# Patient Record
Sex: FEMALE | Race: WHITE | NOT HISPANIC OR LATINO | Employment: OTHER | URBAN - METROPOLITAN AREA
[De-identification: names, ages, dates, MRNs, and addresses within clinical notes are randomized per-mention and may not be internally consistent; named-entity substitution may affect disease eponyms.]

---

## 2017-07-05 ENCOUNTER — HOSPITAL ENCOUNTER (OUTPATIENT)
Dept: GENERAL RADIOLOGY | Facility: HOSPITAL | Age: 72
Discharge: HOME | End: 2017-07-05
Admitting: ORTHOPAEDIC SURGERY

## 2017-07-05 ENCOUNTER — APPOINTMENT (OUTPATIENT)
Dept: PREADMISSION TESTING | Facility: HOSPITAL | Age: 72
End: 2017-07-05

## 2017-07-05 VITALS — HEIGHT: 66 IN | WEIGHT: 201.94 LBS | BODY MASS INDEX: 32.45 KG/M2

## 2017-07-05 DIAGNOSIS — Z01.89 LABORATORY TEST: Primary | ICD-10-CM

## 2017-07-05 LAB
ABO GROUP BLD: NORMAL
ALBUMIN SERPL-MCNC: 4 G/DL (ref 3.2–4.8)
ALBUMIN/GLOB SERPL: 1.5 G/DL (ref 1.5–2.5)
ALP SERPL-CCNC: 161 U/L (ref 25–100)
ALT SERPL W P-5'-P-CCNC: 11 U/L (ref 7–40)
ANION GAP SERPL CALCULATED.3IONS-SCNC: 16 MMOL/L (ref 3–11)
AST SERPL-CCNC: 19 U/L (ref 0–33)
BILIRUB SERPL-MCNC: 0.4 MG/DL (ref 0.3–1.2)
BUN BLD-MCNC: 18 MG/DL (ref 9–23)
BUN/CREAT SERPL: 22.5 (ref 7–25)
CALCIUM SPEC-SCNC: 9.4 MG/DL (ref 8.7–10.4)
CHLORIDE SERPL-SCNC: 97 MMOL/L (ref 99–109)
CO2 SERPL-SCNC: 23 MMOL/L (ref 20–31)
CREAT BLD-MCNC: 0.8 MG/DL (ref 0.6–1.3)
DEPRECATED RDW RBC AUTO: 43.3 FL (ref 37–54)
ERYTHROCYTE [DISTWIDTH] IN BLOOD BY AUTOMATED COUNT: 13.7 % (ref 11.3–14.5)
GFR SERPL CREATININE-BSD FRML MDRD: 70 ML/MIN/1.73
GLOBULIN UR ELPH-MCNC: 2.7 GM/DL
GLUCOSE BLD-MCNC: 100 MG/DL (ref 70–100)
HBA1C MFR BLD: 5.3 % (ref 4.8–5.6)
HCT VFR BLD AUTO: 35.9 % (ref 34.5–44)
HGB BLD-MCNC: 11.5 G/DL (ref 11.5–15.5)
MCH RBC QN AUTO: 27.7 PG (ref 27–31)
MCHC RBC AUTO-ENTMCNC: 32 G/DL (ref 32–36)
MCV RBC AUTO: 86.5 FL (ref 80–99)
PLATELET # BLD AUTO: 257 10*3/MM3 (ref 150–450)
PMV BLD AUTO: 9.7 FL (ref 6–12)
POTASSIUM BLD-SCNC: 4.4 MMOL/L (ref 3.5–5.5)
PROT SERPL-MCNC: 6.7 G/DL (ref 5.7–8.2)
RBC # BLD AUTO: 4.15 10*6/MM3 (ref 3.89–5.14)
RH BLD: POSITIVE
SODIUM BLD-SCNC: 136 MMOL/L (ref 132–146)
WBC NRBC COR # BLD: 4.5 10*3/MM3 (ref 3.5–10.8)

## 2017-07-05 PROCEDURE — 36415 COLL VENOUS BLD VENIPUNCTURE: CPT

## 2017-07-05 PROCEDURE — 80053 COMPREHEN METABOLIC PANEL: CPT | Performed by: ORTHOPAEDIC SURGERY

## 2017-07-05 PROCEDURE — 71020 HC CHEST PA AND LATERAL: CPT

## 2017-07-05 PROCEDURE — 86901 BLOOD TYPING SEROLOGIC RH(D): CPT

## 2017-07-05 PROCEDURE — 85027 COMPLETE CBC AUTOMATED: CPT | Performed by: ORTHOPAEDIC SURGERY

## 2017-07-05 PROCEDURE — 93005 ELECTROCARDIOGRAM TRACING: CPT

## 2017-07-05 PROCEDURE — 83036 HEMOGLOBIN GLYCOSYLATED A1C: CPT | Performed by: ORTHOPAEDIC SURGERY

## 2017-07-05 PROCEDURE — 86900 BLOOD TYPING SEROLOGIC ABO: CPT

## 2017-07-05 RX ORDER — RANITIDINE 150 MG/1
150 TABLET ORAL 2 TIMES DAILY
COMMUNITY

## 2017-07-05 RX ORDER — PRAVASTATIN SODIUM 40 MG
40 TABLET ORAL DAILY
COMMUNITY

## 2017-07-05 RX ORDER — ALBUTEROL SULFATE 90 UG/1
2 AEROSOL, METERED RESPIRATORY (INHALATION) EVERY 4 HOURS PRN
COMMUNITY

## 2017-07-05 RX ORDER — PROPRANOLOL HYDROCHLORIDE 120 MG/1
120 CAPSULE, EXTENDED RELEASE ORAL DAILY
COMMUNITY

## 2017-07-05 RX ORDER — LOSARTAN POTASSIUM 100 MG/1
100 TABLET ORAL DAILY
COMMUNITY

## 2017-07-05 NOTE — DISCHARGE INSTRUCTIONS
The following information and instructions were given:    NPO after MN except sips of water with routine prescribed medication (except blood thinner, diabetes, or weight reducing medication) unless otherwise instructed by your physician.  Do not eat, drink, smoke or chew gum after MN the night before surgery. This also includes no mints.    DO NOT shave, wear makeup or dark nail polish.    Remove all jewelry (advised to go to jeweler if unable to remove).    Leave anything you consider valuable at home.    Leave your suitcase in the car until after your surgery.    Bring the following with you (if applicable)   -picture ID and insurance cards   -Co-pay/deductible required by insurance   -Medications in the original bottles (not a list) including all over-the-counter  medications if not brought to PAT   -Copy of advance directive, living will or power of  documents if not  brought to PAT   -CPAP or BIPAP mask and tubing (do not bring machine)   -Skin prep instructions sheet   -PAT Pass   Education booklet, brochure, handout or binder given to patient.    Pain Control After Surgery handout given to patient.    Respirex use (handout given to patient) and pneumonia prevention.    Signs and Symptoms of infection.    DVT Prevention stressing the importance of ambulation.    Patient to apply Chlorhexadine wipes to surgical area (as instructed) the night before procedure and the AM of procedure.           walking/toileting/standing

## 2017-07-10 ENCOUNTER — HOSPITAL ENCOUNTER (INPATIENT)
Facility: HOSPITAL | Age: 72
LOS: 2 days | Discharge: HOME HEALTH | End: 2017-07-12
Attending: ORTHOPAEDIC SURGERY | Admitting: ORTHOPAEDIC SURGERY

## 2017-07-10 ENCOUNTER — APPOINTMENT (OUTPATIENT)
Dept: GENERAL RADIOLOGY | Facility: HOSPITAL | Age: 72
End: 2017-07-10

## 2017-07-10 ENCOUNTER — ANESTHESIA EVENT (OUTPATIENT)
Dept: PERIOP | Facility: HOSPITAL | Age: 72
End: 2017-07-10

## 2017-07-10 ENCOUNTER — ANESTHESIA (OUTPATIENT)
Dept: PERIOP | Facility: HOSPITAL | Age: 72
End: 2017-07-10

## 2017-07-10 DIAGNOSIS — Z78.9 IMPAIRED MOBILITY AND ADLS: ICD-10-CM

## 2017-07-10 DIAGNOSIS — Z74.09 IMPAIRED MOBILITY AND ADLS: ICD-10-CM

## 2017-07-10 DIAGNOSIS — Z74.09 IMPAIRED FUNCTIONAL MOBILITY, BALANCE, GAIT, AND ENDURANCE: Primary | ICD-10-CM

## 2017-07-10 PROBLEM — M17.11 ARTHRITIS OF KNEE, RIGHT: Status: ACTIVE | Noted: 2017-07-10

## 2017-07-10 PROBLEM — J44.9 COPD (CHRONIC OBSTRUCTIVE PULMONARY DISEASE) (HCC): Status: ACTIVE | Noted: 2017-07-10

## 2017-07-10 PROBLEM — I10 HTN (HYPERTENSION): Status: ACTIVE | Noted: 2017-07-10

## 2017-07-10 PROBLEM — K21.9 GERD (GASTROESOPHAGEAL REFLUX DISEASE): Status: ACTIVE | Noted: 2017-07-10

## 2017-07-10 PROBLEM — Z96.651 STATUS POST TOTAL RIGHT KNEE REPLACEMENT: Status: ACTIVE | Noted: 2017-07-10

## 2017-07-10 PROBLEM — E78.5 HYPERLIPIDEMIA: Status: ACTIVE | Noted: 2017-07-10

## 2017-07-10 LAB
ABO GROUP BLD: NORMAL
BLD GP AB SCN SERPL QL: NEGATIVE
POTASSIUM BLDA-SCNC: 3.98 MMOL/L (ref 3.5–5.3)
RH BLD: POSITIVE

## 2017-07-10 PROCEDURE — 25010000002 PROCHLORPERAZINE EDISYLATE PER 10 MG: Performed by: NURSE PRACTITIONER

## 2017-07-10 PROCEDURE — C1776 JOINT DEVICE (IMPLANTABLE): HCPCS | Performed by: ORTHOPAEDIC SURGERY

## 2017-07-10 PROCEDURE — 25010000002 ONDANSETRON PER 1 MG: Performed by: INTERNAL MEDICINE

## 2017-07-10 PROCEDURE — 25010000002 PROPOFOL 1000 MG/ML EMULSION: Performed by: NURSE ANESTHETIST, CERTIFIED REGISTERED

## 2017-07-10 PROCEDURE — 86900 BLOOD TYPING SEROLOGIC ABO: CPT | Performed by: ORTHOPAEDIC SURGERY

## 2017-07-10 PROCEDURE — 25010000002 ROPIVACAINE PER 1 MG: Performed by: NURSE ANESTHETIST, CERTIFIED REGISTERED

## 2017-07-10 PROCEDURE — 97116 GAIT TRAINING THERAPY: CPT

## 2017-07-10 PROCEDURE — 84132 ASSAY OF SERUM POTASSIUM: CPT | Performed by: ANESTHESIOLOGY

## 2017-07-10 PROCEDURE — C1713 ANCHOR/SCREW BN/BN,TIS/BN: HCPCS | Performed by: ORTHOPAEDIC SURGERY

## 2017-07-10 PROCEDURE — 97110 THERAPEUTIC EXERCISES: CPT

## 2017-07-10 PROCEDURE — 86850 RBC ANTIBODY SCREEN: CPT | Performed by: ORTHOPAEDIC SURGERY

## 2017-07-10 PROCEDURE — 97162 PT EVAL MOD COMPLEX 30 MIN: CPT

## 2017-07-10 PROCEDURE — 25010000003 CEFAZOLIN IN DEXTROSE 2-4 GM/100ML-% SOLUTION: Performed by: ORTHOPAEDIC SURGERY

## 2017-07-10 PROCEDURE — 86901 BLOOD TYPING SEROLOGIC RH(D): CPT | Performed by: ORTHOPAEDIC SURGERY

## 2017-07-10 PROCEDURE — 73560 X-RAY EXAM OF KNEE 1 OR 2: CPT

## 2017-07-10 PROCEDURE — 25010000002 ONDANSETRON PER 1 MG: Performed by: NURSE ANESTHETIST, CERTIFIED REGISTERED

## 2017-07-10 PROCEDURE — 25010000002 HYDROMORPHONE PER 4 MG: Performed by: ORTHOPAEDIC SURGERY

## 2017-07-10 PROCEDURE — C1755 CATHETER, INTRASPINAL: HCPCS | Performed by: ORTHOPAEDIC SURGERY

## 2017-07-10 PROCEDURE — 0SRC0J9 REPLACEMENT OF RIGHT KNEE JOINT WITH SYNTHETIC SUBSTITUTE, CEMENTED, OPEN APPROACH: ICD-10-PCS | Performed by: ORTHOPAEDIC SURGERY

## 2017-07-10 DEVICE — TOTL KN ATTUNE DEPUY 9527038: Type: IMPLANTABLE DEVICE | Site: KNEE | Status: FUNCTIONAL

## 2017-07-10 DEVICE — ATTUNE PATELLA MEDIALIZED ANATOMIC 32MM CEMENTED AOX
Type: IMPLANTABLE DEVICE | Site: KNEE | Status: FUNCTIONAL
Brand: ATTUNE

## 2017-07-10 DEVICE — ATTUNE KNEE SYSTEM TIBIAL INSERT ROTATING PLATFORM CRUCIATE RETAINING SIZE 5 5MM AOX
Type: IMPLANTABLE DEVICE | Site: KNEE | Status: FUNCTIONAL
Brand: ATTUNE

## 2017-07-10 DEVICE — SMARTSET HIGH PERFORMANCE MV MEDIUM VISCOSITY BONE CEMENT 40G
Type: IMPLANTABLE DEVICE | Site: KNEE | Status: FUNCTIONAL
Brand: SMARTSET

## 2017-07-10 DEVICE — ATTUNE KNEE SYSTEM TIBIAL BASE ROTATING PLATFORM SIZE 5 CEMENTED
Type: IMPLANTABLE DEVICE | Site: KNEE | Status: FUNCTIONAL
Brand: ATTUNE

## 2017-07-10 DEVICE — ATTUNE KNEE SYSTEM FEMORAL CRUCIATE RETAINING SIZE 5 RIGHT CEMENTED
Type: IMPLANTABLE DEVICE | Site: KNEE | Status: FUNCTIONAL
Brand: ATTUNE

## 2017-07-10 RX ORDER — ALBUTEROL SULFATE 2.5 MG/3ML
2.5 SOLUTION RESPIRATORY (INHALATION) EVERY 6 HOURS PRN
Status: DISCONTINUED | OUTPATIENT
Start: 2017-07-10 | End: 2017-07-12 | Stop reason: HOSPADM

## 2017-07-10 RX ORDER — CEFAZOLIN SODIUM 2 G/100ML
2 INJECTION, SOLUTION INTRAVENOUS ONCE
Status: DISCONTINUED | OUTPATIENT
Start: 2017-07-10 | End: 2017-07-10 | Stop reason: HOSPADM

## 2017-07-10 RX ORDER — ONDANSETRON 2 MG/ML
4 INJECTION INTRAMUSCULAR; INTRAVENOUS EVERY 6 HOURS PRN
Status: DISCONTINUED | OUTPATIENT
Start: 2017-07-10 | End: 2017-07-12 | Stop reason: HOSPADM

## 2017-07-10 RX ORDER — BISACODYL 5 MG/1
10 TABLET, DELAYED RELEASE ORAL DAILY PRN
Status: DISCONTINUED | OUTPATIENT
Start: 2017-07-10 | End: 2017-07-12 | Stop reason: HOSPADM

## 2017-07-10 RX ORDER — ONDANSETRON 2 MG/ML
4 INJECTION INTRAMUSCULAR; INTRAVENOUS ONCE AS NEEDED
Status: COMPLETED | OUTPATIENT
Start: 2017-07-10 | End: 2017-07-10

## 2017-07-10 RX ORDER — PREGABALIN 75 MG/1
75 CAPSULE ORAL ONCE
Status: COMPLETED | OUTPATIENT
Start: 2017-07-10 | End: 2017-07-10

## 2017-07-10 RX ORDER — FAMOTIDINE 20 MG/1
20 TABLET, FILM COATED ORAL 2 TIMES DAILY
Status: DISCONTINUED | OUTPATIENT
Start: 2017-07-10 | End: 2017-07-12 | Stop reason: HOSPADM

## 2017-07-10 RX ORDER — CELECOXIB 200 MG/1
200 CAPSULE ORAL ONCE
Status: COMPLETED | OUTPATIENT
Start: 2017-07-10 | End: 2017-07-10

## 2017-07-10 RX ORDER — ACETAMINOPHEN 325 MG/1
650 TABLET ORAL ONCE
Status: COMPLETED | OUTPATIENT
Start: 2017-07-10 | End: 2017-07-10

## 2017-07-10 RX ORDER — TRANEXAMIC ACID 100 MG/ML
INJECTION, SOLUTION INTRAVENOUS AS NEEDED
Status: DISCONTINUED | OUTPATIENT
Start: 2017-07-10 | End: 2017-07-10 | Stop reason: SURG

## 2017-07-10 RX ORDER — NICOTINE POLACRILEX 4 MG
15 LOZENGE BUCCAL
Status: DISCONTINUED | OUTPATIENT
Start: 2017-07-10 | End: 2017-07-10 | Stop reason: HOSPADM

## 2017-07-10 RX ORDER — ACETAMINOPHEN 325 MG/1
650 TABLET ORAL EVERY 4 HOURS PRN
Status: DISCONTINUED | OUTPATIENT
Start: 2017-07-10 | End: 2017-07-12 | Stop reason: HOSPADM

## 2017-07-10 RX ORDER — SODIUM CHLORIDE, SODIUM LACTATE, POTASSIUM CHLORIDE, CALCIUM CHLORIDE 600; 310; 30; 20 MG/100ML; MG/100ML; MG/100ML; MG/100ML
100 INJECTION, SOLUTION INTRAVENOUS CONTINUOUS
Status: DISCONTINUED | OUTPATIENT
Start: 2017-07-10 | End: 2017-07-12 | Stop reason: HOSPADM

## 2017-07-10 RX ORDER — PROPRANOLOL HCL 60 MG
120 CAPSULE, EXTENDED RELEASE 24HR ORAL DAILY
Status: DISCONTINUED | OUTPATIENT
Start: 2017-07-10 | End: 2017-07-12 | Stop reason: HOSPADM

## 2017-07-10 RX ORDER — BUPIVACAINE HYDROCHLORIDE 5 MG/ML
INJECTION, SOLUTION EPIDURAL; INTRACAUDAL AS NEEDED
Status: DISCONTINUED | OUTPATIENT
Start: 2017-07-10 | End: 2017-07-10 | Stop reason: SURG

## 2017-07-10 RX ORDER — CEFAZOLIN SODIUM 2 G/100ML
2 INJECTION, SOLUTION INTRAVENOUS EVERY 8 HOURS
Status: COMPLETED | OUTPATIENT
Start: 2017-07-10 | End: 2017-07-11

## 2017-07-10 RX ORDER — FAMOTIDINE 20 MG/1
20 TABLET, FILM COATED ORAL ONCE
Status: COMPLETED | OUTPATIENT
Start: 2017-07-10 | End: 2017-07-10

## 2017-07-10 RX ORDER — MAGNESIUM HYDROXIDE 1200 MG/15ML
LIQUID ORAL AS NEEDED
Status: DISCONTINUED | OUTPATIENT
Start: 2017-07-10 | End: 2017-07-10 | Stop reason: HOSPADM

## 2017-07-10 RX ORDER — SODIUM CHLORIDE 0.9 % (FLUSH) 0.9 %
1-10 SYRINGE (ML) INJECTION AS NEEDED
Status: DISCONTINUED | OUTPATIENT
Start: 2017-07-10 | End: 2017-07-12 | Stop reason: HOSPADM

## 2017-07-10 RX ORDER — HYDROMORPHONE HYDROCHLORIDE 1 MG/ML
0.5 INJECTION, SOLUTION INTRAMUSCULAR; INTRAVENOUS; SUBCUTANEOUS
Status: DISCONTINUED | OUTPATIENT
Start: 2017-07-10 | End: 2017-07-12 | Stop reason: HOSPADM

## 2017-07-10 RX ORDER — SODIUM CHLORIDE 9 MG/ML
150 INJECTION, SOLUTION INTRAVENOUS CONTINUOUS
Status: DISCONTINUED | OUTPATIENT
Start: 2017-07-10 | End: 2017-07-12 | Stop reason: HOSPADM

## 2017-07-10 RX ORDER — PROCHLORPERAZINE 25 MG
25 SUPPOSITORY, RECTAL RECTAL EVERY 12 HOURS PRN
Status: DISCONTINUED | OUTPATIENT
Start: 2017-07-10 | End: 2017-07-12 | Stop reason: HOSPADM

## 2017-07-10 RX ORDER — FENTANYL CITRATE 50 UG/ML
50 INJECTION, SOLUTION INTRAMUSCULAR; INTRAVENOUS
Status: DISCONTINUED | OUTPATIENT
Start: 2017-07-10 | End: 2017-07-10 | Stop reason: HOSPADM

## 2017-07-10 RX ORDER — DEXTROSE MONOHYDRATE 25 G/50ML
25 INJECTION, SOLUTION INTRAVENOUS
Status: DISCONTINUED | OUTPATIENT
Start: 2017-07-10 | End: 2017-07-10 | Stop reason: HOSPADM

## 2017-07-10 RX ORDER — BISACODYL 10 MG
10 SUPPOSITORY, RECTAL RECTAL DAILY PRN
Status: DISCONTINUED | OUTPATIENT
Start: 2017-07-10 | End: 2017-07-12 | Stop reason: HOSPADM

## 2017-07-10 RX ORDER — ACETAMINOPHEN 160 MG/5ML
650 SOLUTION ORAL EVERY 4 HOURS PRN
Status: DISCONTINUED | OUTPATIENT
Start: 2017-07-10 | End: 2017-07-12 | Stop reason: HOSPADM

## 2017-07-10 RX ORDER — HYDRALAZINE HYDROCHLORIDE 20 MG/ML
10 INJECTION INTRAMUSCULAR; INTRAVENOUS EVERY 6 HOURS PRN
Status: DISCONTINUED | OUTPATIENT
Start: 2017-07-10 | End: 2017-07-12 | Stop reason: HOSPADM

## 2017-07-10 RX ORDER — IPRATROPIUM BROMIDE AND ALBUTEROL SULFATE 2.5; .5 MG/3ML; MG/3ML
3 SOLUTION RESPIRATORY (INHALATION)
Status: DISCONTINUED | OUTPATIENT
Start: 2017-07-10 | End: 2017-07-11

## 2017-07-10 RX ORDER — ROPIVACAINE HYDROCHLORIDE 2 MG/ML
12 INJECTION, SOLUTION EPIDURAL; INFILTRATION CONTINUOUS
Status: DISCONTINUED | OUTPATIENT
Start: 2017-07-10 | End: 2017-07-12

## 2017-07-10 RX ORDER — BUPIVACAINE HYDROCHLORIDE 2.5 MG/ML
INJECTION, SOLUTION EPIDURAL; INFILTRATION; INTRACAUDAL AS NEEDED
Status: DISCONTINUED | OUTPATIENT
Start: 2017-07-10 | End: 2017-07-10 | Stop reason: SURG

## 2017-07-10 RX ORDER — SODIUM CHLORIDE, SODIUM LACTATE, POTASSIUM CHLORIDE, CALCIUM CHLORIDE 600; 310; 30; 20 MG/100ML; MG/100ML; MG/100ML; MG/100ML
9 INJECTION, SOLUTION INTRAVENOUS CONTINUOUS
Status: DISCONTINUED | OUTPATIENT
Start: 2017-07-10 | End: 2017-07-12 | Stop reason: HOSPADM

## 2017-07-10 RX ORDER — HYDROCODONE BITARTRATE AND ACETAMINOPHEN 5; 325 MG/1; MG/1
1 TABLET ORAL EVERY 4 HOURS PRN
Status: DISCONTINUED | OUTPATIENT
Start: 2017-07-10 | End: 2017-07-12 | Stop reason: HOSPADM

## 2017-07-10 RX ORDER — NALOXONE HCL 0.4 MG/ML
0.1 VIAL (ML) INJECTION
Status: DISCONTINUED | OUTPATIENT
Start: 2017-07-10 | End: 2017-07-12 | Stop reason: HOSPADM

## 2017-07-10 RX ORDER — MEPERIDINE HYDROCHLORIDE 25 MG/ML
12.5 INJECTION INTRAMUSCULAR; INTRAVENOUS; SUBCUTANEOUS
Status: DISCONTINUED | OUTPATIENT
Start: 2017-07-10 | End: 2017-07-10 | Stop reason: HOSPADM

## 2017-07-10 RX ORDER — PROCHLORPERAZINE MALEATE 5 MG/1
5 TABLET ORAL EVERY 6 HOURS PRN
Status: DISCONTINUED | OUTPATIENT
Start: 2017-07-10 | End: 2017-07-12 | Stop reason: HOSPADM

## 2017-07-10 RX ORDER — LOSARTAN POTASSIUM 50 MG/1
100 TABLET ORAL DAILY
Status: DISCONTINUED | OUTPATIENT
Start: 2017-07-11 | End: 2017-07-12 | Stop reason: HOSPADM

## 2017-07-10 RX ORDER — LIDOCAINE HYDROCHLORIDE 10 MG/ML
0.2 INJECTION, SOLUTION EPIDURAL; INFILTRATION; INTRACAUDAL; PERINEURAL ONCE
Status: COMPLETED | OUTPATIENT
Start: 2017-07-10 | End: 2017-07-10

## 2017-07-10 RX ORDER — DOCUSATE SODIUM 100 MG/1
100 CAPSULE, LIQUID FILLED ORAL 2 TIMES DAILY
Status: DISCONTINUED | OUTPATIENT
Start: 2017-07-10 | End: 2017-07-12 | Stop reason: HOSPADM

## 2017-07-10 RX ORDER — ASPIRIN 325 MG
325 TABLET, DELAYED RELEASE (ENTERIC COATED) ORAL DAILY
Status: DISCONTINUED | OUTPATIENT
Start: 2017-07-11 | End: 2017-07-12 | Stop reason: HOSPADM

## 2017-07-10 RX ORDER — ATORVASTATIN CALCIUM 10 MG/1
10 TABLET, FILM COATED ORAL DAILY
Status: DISCONTINUED | OUTPATIENT
Start: 2017-07-10 | End: 2017-07-12 | Stop reason: HOSPADM

## 2017-07-10 RX ORDER — SODIUM CHLORIDE 0.9 % (FLUSH) 0.9 %
1-10 SYRINGE (ML) INJECTION AS NEEDED
Status: DISCONTINUED | OUTPATIENT
Start: 2017-07-10 | End: 2017-07-10 | Stop reason: HOSPADM

## 2017-07-10 RX ADMIN — HYDROMORPHONE HYDROCHLORIDE 0.5 MG: 1 INJECTION, SOLUTION INTRAMUSCULAR; INTRAVENOUS; SUBCUTANEOUS at 16:47

## 2017-07-10 RX ADMIN — PREGABALIN 75 MG: 75 CAPSULE ORAL at 08:12

## 2017-07-10 RX ADMIN — HYDROCODONE BITARTRATE AND ACETAMINOPHEN 1 TABLET: 5; 325 TABLET ORAL at 21:29

## 2017-07-10 RX ADMIN — PROPOFOL 75 MCG/KG/MIN: 10 INJECTION, EMULSION INTRAVENOUS at 10:39

## 2017-07-10 RX ADMIN — HYDROMORPHONE HYDROCHLORIDE 0.5 MG: 1 INJECTION, SOLUTION INTRAMUSCULAR; INTRAVENOUS; SUBCUTANEOUS at 23:41

## 2017-07-10 RX ADMIN — BUPIVACAINE HYDROCHLORIDE 40 ML: 2.5 INJECTION, SOLUTION EPIDURAL; INFILTRATION; INTRACAUDAL; PERINEURAL at 10:38

## 2017-07-10 RX ADMIN — ACETAMINOPHEN 650 MG: 325 TABLET ORAL at 08:12

## 2017-07-10 RX ADMIN — BUPIVACAINE HYDROCHLORIDE 20 ML: 2.5 INJECTION, SOLUTION EPIDURAL; INFILTRATION; INTRACAUDAL; PERINEURAL at 12:21

## 2017-07-10 RX ADMIN — ONDANSETRON 4 MG: 2 INJECTION INTRAMUSCULAR; INTRAVENOUS at 13:59

## 2017-07-10 RX ADMIN — CEFAZOLIN SODIUM 2 G: 2 INJECTION, SOLUTION INTRAVENOUS at 19:34

## 2017-07-10 RX ADMIN — ATORVASTATIN CALCIUM 10 MG: 10 TABLET, FILM COATED ORAL at 19:33

## 2017-07-10 RX ADMIN — TRANEXAMIC ACID 916 MG: 100 INJECTION, SOLUTION INTRAVENOUS at 11:50

## 2017-07-10 RX ADMIN — CELECOXIB 200 MG: 200 CAPSULE ORAL at 08:12

## 2017-07-10 RX ADMIN — BUPIVACAINE HYDROCHLORIDE 2.5 ML: 5 INJECTION, SOLUTION EPIDURAL; INTRACAUDAL; PERINEURAL at 10:35

## 2017-07-10 RX ADMIN — LIDOCAINE HYDROCHLORIDE 0.2 ML: 10 INJECTION, SOLUTION EPIDURAL; INFILTRATION; INTRACAUDAL; PERINEURAL at 07:53

## 2017-07-10 RX ADMIN — MUPIROCIN: 20 OINTMENT TOPICAL at 08:12

## 2017-07-10 RX ADMIN — PROCHLORPERAZINE EDISYLATE 5 MG: 5 INJECTION INTRAMUSCULAR; INTRAVENOUS at 16:46

## 2017-07-10 RX ADMIN — FAMOTIDINE 20 MG: 20 TABLET ORAL at 08:12

## 2017-07-10 RX ADMIN — PROCHLORPERAZINE EDISYLATE 5 MG: 5 INJECTION INTRAMUSCULAR; INTRAVENOUS at 23:45

## 2017-07-10 RX ADMIN — PROPRANOLOL HYDROCHLORIDE 120 MG: 60 CAPSULE, EXTENDED RELEASE ORAL at 19:33

## 2017-07-10 RX ADMIN — SODIUM CHLORIDE, POTASSIUM CHLORIDE, SODIUM LACTATE AND CALCIUM CHLORIDE: 600; 310; 30; 20 INJECTION, SOLUTION INTRAVENOUS at 10:27

## 2017-07-10 RX ADMIN — TRANEXAMIC ACID 916 MG: 100 INJECTION, SOLUTION INTRAVENOUS at 10:37

## 2017-07-10 RX ADMIN — FAMOTIDINE 20 MG: 20 TABLET ORAL at 19:34

## 2017-07-10 RX ADMIN — SODIUM CHLORIDE, POTASSIUM CHLORIDE, SODIUM LACTATE AND CALCIUM CHLORIDE 9 ML/HR: 600; 310; 30; 20 INJECTION, SOLUTION INTRAVENOUS at 07:53

## 2017-07-10 RX ADMIN — EPHEDRINE SULFATE 10 MG: 50 INJECTION INTRAMUSCULAR; INTRAVENOUS; SUBCUTANEOUS at 11:40

## 2017-07-10 RX ADMIN — ROPIVACAINE HYDROCHLORIDE 12 ML/HR: 2 INJECTION, SOLUTION EPIDURAL; INFILTRATION at 12:35

## 2017-07-10 RX ADMIN — SODIUM CHLORIDE 150 ML/HR: 9 INJECTION, SOLUTION INTRAVENOUS at 13:29

## 2017-07-10 RX ADMIN — ONDANSETRON 4 MG: 2 INJECTION INTRAMUSCULAR; INTRAVENOUS at 12:55

## 2017-07-10 RX ADMIN — SODIUM CHLORIDE, POTASSIUM CHLORIDE, SODIUM LACTATE AND CALCIUM CHLORIDE 100 ML/HR: 600; 310; 30; 20 INJECTION, SOLUTION INTRAVENOUS at 13:28

## 2017-07-10 NOTE — ANESTHESIA PREPROCEDURE EVALUATION
Anesthesia Evaluation     Patient summary reviewed and Nursing notes reviewed   no history of anesthetic complications:  NPO Solid Status: > 8 hours  NPO Liquid Status: > 8 hours     Airway   Mallampati: II  TM distance: >3 FB  Neck ROM: full  Dental    (+) upper dentures and lower dentures    Pulmonary - negative pulmonary ROS    breath sounds clear to auscultation  Cardiovascular     ECG reviewed  Rhythm: regular  Rate: normal    (+) hypertension, hyperlipidemia  (-) angina, FRANKS      Neuro/Psych  GI/Hepatic/Renal/Endo    (+)  GERD well controlled,     Musculoskeletal     (+) arthralgias,   Abdominal    Substance History      OB/GYN          Other   (+) arthritis   history of cancer (skin)                                    Anesthesia Plan    ASA 2     MAC, spinal and regional   (Labs/studies reviewed  ACB post op)  intravenous induction   Anesthetic plan and risks discussed with patient.  Use of blood products discussed with patient  Consented to blood products.   Plan discussed with CRNA.

## 2017-07-10 NOTE — ANESTHESIA PROCEDURE NOTES
Peripheral Block    Patient location during procedure: post-op  Start time: 7/10/2017 12:24 PM  Stop time: 7/10/2017 12:24 PM  Reason for block: at surgeon's request and post-op pain management  Performed by  Anesthesiologist: HERMES ROSADO  CRNA: LUIS SANCHEZ  Assisted by: SHAVON GOODRICH  Preanesthetic Checklist  Completed: patient identified, site marked, surgical consent, pre-op evaluation, timeout performed, risks and benefits discussed and monitors and equipment checked  Peripheral Block Prep:  Sterile barriers:cap, gloves, mask and sterile barriers  Prep: ChloraPrep  Patient monitoring: blood pressure monitoring, continuous pulse oximetry and EKG  Peripheral Procedure  Guidance:ultrasound guided  Images:still images obtained    Laterality:right  Block Type:adductor canal block  Injection Technique:catheter  Needle Type:Tuohy  Needle Gauge:18 G  Catheter Size:20 G (20g)  Medications  Local Injected:bupivacaine 0.25% Local Amount Injected:20 (ml)mL  Post Assessment  Injection Assessment: negative aspiration for heme, incremental injection and no paresthesia on injection  Patient Tolerance:comfortable throughout block  Complications:no  Additional Notes  Procedure:           CATHETER at skin: 11                               Analgesia was achieved with sab       The pt was placed in the Supine position.  The Insertion site was  prepped and Draped in sterile fashion.  A BBraun 4 inch 18g echogenic needle was then  inserted approximately midline, mid-thigh and advanced In-plane with Ultrasound guidance.  Normal Saline PSF was utilized for hydrodissection of tissue.  The Vastus medialis and Sartorius muscle where visualized and the needle tip was placed in the adductor canal,  lateral to the femoral artery.  LA injection spread was visualized, injection was incremental 1-5ml, injection pressure was normal or little, no intraneural injection, no vascular injection.  LA dose was injected thru the needle(see  dose above).  A BBraun 20g wire stylet catheter was placed via the needle with ultrasound visualization and confirmation with NS fluid bolus. The labeled Catheter was then secured to skin at insertion site with skin afix and steristrips to curled catheter and CHG transparent dressing.  Thank you.

## 2017-07-10 NOTE — THERAPY EVALUATION
Acute Care - Physical Therapy Initial Evaluation  [facility]     Patient Name: Kylee Robles  : 1945  MRN: [id number]  Today's Date: 7/10/2017   Onset of Illness/Injury or Date of Surgery Date: 07/10/17  Date of Referral to PT: 07/10/17  Referring Physician: MD Edwin      Admit Date: 7/10/2017     Visit Dx:    ICD-10-CM ICD-9-CM   1. Impaired functional mobility, balance, gait, and endurance Z74.09 V49.89     Patient Active Problem List   Diagnosis   • Arthritis of knee, right   • Status post total right knee replacement   • GERD (gastroesophageal reflux disease)   • Hyperlipidemia   • HTN (hypertension)   • COPD (chronic obstructive pulmonary disease)     Past Medical History:   Diagnosis Date   • Anemia    • Arthritis    • Cancer     SKIN - BASALCELL- EAR   • GERD (gastroesophageal reflux disease)    • High cholesterol    • History of transfusion     2016   • Hypertension      Past Surgical History:   Procedure Laterality Date   • COLONOSCOPY W/ POLYPECTOMY     • ENDOSCOPY            PT ASSESSMENT (last 72 hours)      PT Evaluation       07/10/17 1545 07/10/17 0756    Rehab Evaluation    Document Type evaluation  -LR     Subjective Information agree to therapy;complains of;pain;nausea/vomiting  -LR     Patient Effort, Rehab Treatment good  -LR     Symptoms Noted During/After Treatment increased pain;other (see comments)   nausea  -LR     Symptoms Noted Comment Notified RN.   -LR     General Information    Patient Profile Review yes  -LR     Onset of Illness/Injury or Date of Surgery Date 07/10/17  -LR     Referring Physician MD Edwin  -LR     General Observations Patient supine in bed upon arrival with several family members present. IV, R adductor canal nerve catheter, R knee ace bandaged, SCDs.   -LR     Pertinent History Of Current Problem Patient presents for surgical management of persistent and progressive R knee pain and dysfunction that has failed to improve with conservative managefment.   -LR      Precautions/Limitations fall precautions;other (see comments)   R adductor canal nerve catheter; nausea  -LR     Prior Level of Function min assist:;all household mobility;community mobility;gait;transfer;bed mobility;home management;cooking;cleaning;shopping;using stairs;independent:;driving   SPC for all mobility,required rest breaks for clean/distance  -LR     Equipment Currently Used at Home cane, straight;walker, rolling;commode   used SPC at all times for mobility  -LR cane, straight;raised toilet  -NM    Plans/Goals Discussed With patient and family;agreed upon  -LR     Risks Reviewed patient and family:;LOB;nausea/vomiting;dizziness;increased discomfort  -LR     Benefits Reviewed patient and family:;improve function;increase independence;increase strength;increase balance;decrease pain;increase knowledge  -LR     Barriers to Rehab previous functional deficit;medically complex  -LR     Living Environment    Lives With other relative(s) (specify)   granddaughter  -LR other relative(s) (specify)  -NM    Living Arrangements house  -LR house  -NM    Home Accessibility bed and bath on same level;house is not wheelchair accessible;stairs to enter home;tub/shower is not walk in  -LR     Number of Stairs to Enter Home 2  -LR     Stair Railings at Home outside, present on left side   post on L side of porch, uses as handrail.   -LR     Type of Financial/Environmental Concern none  -LR     Transportation Available family or friend will provide  -LR car;family or friend will provide  -NM    Living Environment Comment Patient reports someone with her family will be with her at all times to assist upon d/c home.   -LR     Clinical Impression    Date of Referral to PT 07/10/17  -LR     PT Diagnosis s/p elective R TKA  -LR     Prognosis good  -LR     Patient/Family Goals Statement go home, decrease pain  -LR     Criteria for Skilled Therapeutic Interventions Met yes;treatment indicated  -LR     Rehab Potential good, to  achieve stated therapy goals  -LR     Pain Assessment    Pain Assessment 0-10  -LR     Pain Score 6  -LR     Post Pain Score 8  -LR     Pain Type Acute pain  -LR     Pain Location Knee  -LR     Pain Orientation Right;Anterior  -LR     Pain Intervention(s) Repositioned;Ambulation/increased activity  -LR     Vision Assessment/Intervention    Visual Impairment WFL  -LR     Cognitive Assessment/Intervention    Current Cognitive/Communication Assessment functional  -LR     Orientation Status oriented x 4;required verbal cueing (specifiy in comments)  -LR     Follows Commands/Answers Questions 100% of the time;able to follow single-step instructions;needs cueing;needs repetition  -LR     Personal Safety WNL/WFL  -LR     ROM (Range of Motion)    General ROM lower extremity range of motion deficits identified  -LR     General LE Assessment    ROM LLE ROM was WFL;knee, right: LE ROM deficit  -LR     ROM Detail R knee AROM impaired 25%  -LR     MMT (Manual Muscle Testing)    General MMT Assessment lower extremity strength deficits identified  -LR     Lower Extremity    Lower Ext Manual Muscle Testing left LE strength is WFL;right knee strength deficit  -LR     Lower Ext Manual Muscle Testing Detail R knee functionally 4-/5  -LR     Mobility Assessment/Training    Extremity Weight-Bearing Status right lower extremity  -LR     Right Lower Extremity Weight-Bearing weight-bearing as tolerated  -LR     Bed Mobility, Assessment/Treatment    Bed Mobility, Assistive Device head of bed elevated;bed rails  -LR     Bed Mob, Supine to Sit, [place] verbal cues required;supervision required  -LR     Bed Mob, Sit to Supine, [place] not tested   UI at end of eval.   -LR     Bed Mobility, Safety Issues decreased use of legs for bridging/pushing  -LR     Bed Mobility, Impairments ROM decreased;strength decreased;pain  -LR     Bed Mobility, Comment Verbal cues to move LEs towards EOB and to push up from bed from behind to raise trunk  into sitting and to scoot hips out to get feet on floor. Patient reported nausea improved once sitting upright.   -LR     Transfer Assessment/Treatment    Transfers, Sit-Stand [place] verbal cues required;moderate assist (50% patient effort);2 person assist required  -LR     Transfers, Stand-Sit [place] verbal cues required;contact guard assist;2 person assist required  -LR     Transfers, Sit-Stand-Sit, Assist Device rolling walker  -LR     Toilet Transfer, [place] verbal cues required;minimum assist (75% patient effort);1 person + 1 person to manage equipment  -LR     Toilet Transfer, Assistive Device rolling walker;bedside commode without drop arms  -LR     Transfer, Safety Issues sequencing ability decreased;step length decreased;weight-shifting ability decreased  -LR     Transfer, Impairments ROM decreased;strength decreased;pain  -LR     Transfer, Comment Verbal cues to step R LE out before t/f and to pull L foot under her to stand on. Cues to push up from bed to stand and to reach back for chair to lower into sitting. Required significant assist to stand from bed. Assisted to and from bathroom. Verbal cues to use armrests of BSC for UE support when t/f on and off toilet. Less assist required to stand from toilet.   -LR     Gait Assessment/Treatment    Gait, [place] Level verbal cues required;contact guard assist;2 person assist required  -LR     Gait, Assistive Device rolling walker  -LR     Gait, Distance (Feet) 100  -LR     Gait, Gait Pattern Analysis swing-through gait  -LR     Gait, Gait Deviations left:;antalgic;decreased heel strike;forward flexed posture;step length decreased;toe-to-floor clearance decreased;weight-shifting ability decreased  -LR     Gait, Safety Issues sequencing ability decreased;step length decreased;weight-shifting ability decreased  -LR     Gait, Impairments ROM decreased;strength decreased;pain  -LR     Gait, Comment Patient ambulated with step to gait pattern  at slow pace. Required constant verbal cues for correct sequencing of steps and to roll rolling walker instead of picking it up. Patient with very forward flexed posture. Intermittent improvements with cues for correction. Gait limited by nausea and pain.   -LR     Therapy Exercises    Exercise Protocols total knee  -LR     Total Knee Exercises right:;10 reps;ankle pumps/circles;quad set;glut set   cues for technique  -LR     Sensory Assessment/Intervention    Sensory Impairment --   c/o mild numbness from spinal; able to actively DF bilateral  -LR     Positioning and Restraints    Pre-Treatment Position in bed  -LR     Post Treatment Position chair  -LR     In Chair notified nsg;reclined;sitting;call light within reach;encouraged to call for assist;with family/caregiver;legs elevated;compression device  -LR       User Key  (r) = Recorded By, (t) = Taken By, (c) = Cosigned By    Initials Name Provider Type    LR Cindy Mcgovern, PT Physical Therapist    ELIU Lew, RN Registered Nurse          Physical Therapy Education     Title: PT OT SLP Therapies (Done)     Topic: Physical Therapy (Done)     Point: Mobility training (Done)    Learning Progress Summary    Learner Readiness Method Response Comment Documented by Status   Patient Acceptance E,D VU,NR Educated on correct gait mechanics, weight bearing status.  07/10/17 1634 Done   Family Acceptance E,D VU,NR Educated on correct gait mechanics, weight bearing status. LR 07/10/17 1634 Done               Point: Home exercise program (Done)    Learning Progress Summary    Learner Readiness Method Response Comment Documented by Status   Patient Acceptance E,D VU,NR Educated on correct gait mechanics, weight bearing status. LR 07/10/17 1634 Done   Family Acceptance E,D VU,NR Educated on correct gait mechanics, weight bearing status. LR 07/10/17 1634 Done               Point: Body mechanics (Done)    Learning Progress Summary    Learner Readiness Method  Response Comment Documented by Status   Patient Acceptance E,D VU,NR Educated on correct gait mechanics, weight bearing status. LR 07/10/17 1634 Done   Family Acceptance E,D VU,NR Educated on correct gait mechanics, weight bearing status. LR 07/10/17 1634 Done               Point: Precautions (Done)    Learning Progress Summary    Learner Readiness Method Response Comment Documented by Status   Patient Acceptance E,D VU,NR Educated on correct gait mechanics, weight bearing status. LR 07/10/17 1634 Done   Family Acceptance E,D VU,NR Educated on correct gait mechanics, weight bearing status. LR 07/10/17 1634 Done                      User Key     Initials Effective Dates Name Provider Type Discipline    LR 06/19/15 -  Cindy Mcgovern, PT Physical Therapist PT                PT Recommendation and Plan  Anticipated Equipment Needs At Discharge: tub bench  Anticipated Discharge Disposition: home with assist, home with home health  Planned Therapy Interventions: balance training, bed mobility training, gait training, home exercise program, patient/family education, ROM (Range of Motion), stair training, strengthening, transfer training  PT Frequency: 2 times/day  Plan of Care Review  Plan Of Care Reviewed With: patient, family  Progress: progress toward functional goals as expected  Outcome Summary/Follow up Plan: Patient ambulated 100 feet with RW, limited by nausea. Plan is d/c home with 24/7 assist from family and HHPT. Will continue to progress mobility as able to prepare for d/c home.           IP PT Goals       07/10/17 1545          Bed Mobility PT LTG    Bed Mobility PT LTG, Date Established 07/10/17  -LR      Bed Mobility PT LTG, Time to Achieve 5 days  -LR      Bed Mobility PT LTG, Activity Type supine to sit/sit to supine  -LR      Bed Mobility PT LTG, [place] Level conditional independence  -LR      Bed Mobility PT LTG, Date Goal Reviewed 07/10/17  -LR      Bed Mobility PT LTG, Outcome goal ongoing   -LR      Transfer Training PT LTG    Transfer Training PT LTG, Date Established 07/10/17  -LR      Transfer Training PT LTG, Time to Achieve 5 days  -LR      Transfer Training PT LTG, Activity Type sit to stand/stand to sit  -LR      Transfer Training PT LTG, [place] Level conditional independence  -LR      Transfer Training PT LTG, Assist Device walker, rolling  -LR      Transfer Training PT  LTG, Date Goal Reviewed 07/10/17  -LR      Transfer Training PT LTG, Outcome goal ongoing  -LR      Gait Training PT LTG    Gait Training Goal PT LTG, Date Established 07/10/17  -LR      Gait Training Goal PT LTG, Time to Achieve 5 days  -LR      Gait Training Goal PT LTG, [place] Level conditional independence  -LR      Gait Training Goal PT LTG, Assist Device walker, rolling  -LR      Gait Training Goal PT LTG, Distance to Achieve 500 feet  -LR      Gait Training Goal PT LTG, Date Goal Reviewed 07/10/17  -LR      Gait Training Goal PT LTG, Outcome goal ongoing  -LR      Stair Training PT LTG    Stair Training Goal PT LTG, Date Established 07/10/17  -LR      Stair Training Goal PT LTG, Time to Achieve 5 days  -LR      Stair Training Goal PT LTG, Number of Steps 2  -LR      Stair Training Goal PT LTG, [place] Level contact guard assist  -LR      Stair Training Goal PT LTG, Assist Device 1 handrail;cane, straight;other (see comments)   or HHA  -LR      Stair Training Goal PT LTG, Date Goal Reviewed 07/10/17  -LR      Stair Training Goal PT LTG, Outcome goal ongoing  -LR      Range of Motion PT LTG    Range of Motion Goal PT LTG, Date Established 07/10/17  -LR      Range of Motion Goal PT LTG, Time to Achieve 5 days  -LR      Range fo Motion Goal PT LTG, Joint R knee  -LR      Range of Motion Goal PT LTG, AAROM Measure 0-90 degrees  -LR      Range of Motion Goal PT LTG, Date Goal Reviewed 07/10/17  -LR      Range of Motion Goal PT LTG, Outcome goal ongoing  -LR        User Key  (r) = Recorded By, (t) = Taken By,  (c) = Cosigned By    Initials Name Provider Type    LR Cindy Mcgovern, PT Physical Therapist                Outcome Measures       07/10/17 1545          How much help from another person do you currently need...    Turning from your back to your side while in flat bed without using bedrails? 3  -LR      Moving from lying on back to sitting on the side of a flat bed without bedrails? 3  -LR      Moving to and from a bed to a chair (including a wheelchair)? 3  -LR      Standing up from a chair using your arms (e.g., wheelchair, bedside chair)? 2  -LR      Climbing 3-5 steps with a railing? 2  -LR      To walk in hospital room? 3  -LR      AM-PAC 6 Clicks Score 16  -LR      Functional Assessment    Outcome Measure Options AM-PAC 6 Clicks Basic Mobility (PT)  -LR        User Key  (r) = Recorded By, (t) = Taken By, (c) = Cosigned By    Initials Name Provider Type    LR Cindy Mcgovern PT Physical Therapist           Time Calculation:         PT Charges       07/10/17 1632          Time Calculation    Start Time 1545  -LR      PT Received On 07/10/17  -LR      PT Goal Re-Cert Due Date 07/20/17  -LR      Time Calculation- PT    Total Timed Code Minutes- PT 23 minute(s)  -LR        User Key  (r) = Recorded By, (t) = Taken By, (c) = Cosigned By    Initials Name Provider Type    LR Cindy Mcgovern, PT Physical Therapist          Therapy Charges for Today     Code Description Service Date Service Provider Modifiers Qty    41134750263 HC GAIT TRAINING EA 15 MIN 7/10/2017 Cindy Mcgovern, PT GP 1    48819785644 HC PT THER PROC EA 15 MIN 7/10/2017 Cindy Mcgovern, PT GP 1    47496014080 HC PT THER SUPP EA 15 MIN 7/10/2017 Cindy Mcgovern, PT GP 3    00419244930 HC PT EVAL MOD COMPLEXITY 3 7/10/2017 Cindy Mcgovern, PT GP 1          PT G-Codes  Outcome Measure Options: AM-PAC 6 Clicks Basic Mobility (PT)      Cindy Mcgovern, PT  7/10/2017

## 2017-07-10 NOTE — PLAN OF CARE
Problem: Patient Care Overview (Adult)  Goal: Plan of Care Review  Outcome: Ongoing (interventions implemented as appropriate)    07/10/17 1545   Coping/Psychosocial Response Interventions   Plan Of Care Reviewed With patient;family   Patient Care Overview   Progress progress toward functional goals as expected   Outcome Evaluation   Outcome Summary/Follow up Plan Patient ambulated 100 feet with RW, limited by nausea. Plan is d/c home with 24/7 assist from family and HHPT. Will continue to progress mobility as able to prepare for d/c home.          Problem: Inpatient Physical Therapy  Goal: Bed Mobility Goal LTG- PT  Outcome: Ongoing (interventions implemented as appropriate)    07/10/17 1545   Bed Mobility PT LTG   Bed Mobility PT LTG, Date Established 07/10/17   Bed Mobility PT LTG, Time to Achieve 5 days   Bed Mobility PT LTG, Activity Type supine to sit/sit to supine   Bed Mobility PT LTG, [place] Level conditional independence   Bed Mobility PT LTG, Date Goal Reviewed 07/10/17   Bed Mobility PT LTG, Outcome goal ongoing       Goal: Transfer Training Goal 1 LTG- PT  Outcome: Ongoing (interventions implemented as appropriate)    07/10/17 1545   Transfer Training PT LTG   Transfer Training PT LTG, Date Established 07/10/17   Transfer Training PT LTG, Time to Achieve 5 days   Transfer Training PT LTG, Activity Type sit to stand/stand to sit   Transfer Training PT LTG, [place] Level conditional independence   Transfer Training PT LTG, Assist Device walker, rolling   Transfer Training PT LTG, Date Goal Reviewed 07/10/17   Transfer Training PT LTG, Outcome goal ongoing       Goal: Gait Training Goal LTG- PT  Outcome: Ongoing (interventions implemented as appropriate)    07/10/17 1545   Gait Training PT LTG   Gait Training Goal PT LTG, Date Established 07/10/17   Gait Training Goal PT LTG, Time to Achieve 5 days   Gait Training Goal PT LTG, [place] Level conditional independence   Gait Training Goal PT  LTG, Assist Device walker, rolling   Gait Training Goal PT LTG, Distance to Achieve 500 feet   Gait Training Goal PT LTG, Date Goal Reviewed 07/10/17   Gait Training Goal PT LTG, Outcome goal ongoing       Goal: Stair Training Goal LTG- PT  Outcome: Ongoing (interventions implemented as appropriate)    07/10/17 1545   Stair Training PT LTG   Stair Training Goal PT LTG, Date Established 07/10/17   Stair Training Goal PT LTG, Time to Achieve 5 days   Stair Training Goal PT LTG, Number of Steps 2   Stair Training Goal PT LTG, [place] Level contact guard assist   Stair Training Goal PT LTG, Assist Device 1 handrail;cane, straight;other (see comments)  (or HHA)   Stair Training Goal PT LTG, Date Goal Reviewed 07/10/17   Stair Training Goal PT LTG, Outcome goal ongoing

## 2017-07-10 NOTE — ANESTHESIA PROCEDURE NOTES
Peripheral Block    Patient location during procedure: pre-op  Reason for block: at surgeon's request and post-op pain management  Performed by  Anesthesiologist: HERMES ROSADO  CRNA: LUIS SANCHEZ  Preanesthetic Checklist  Completed: patient identified, site marked, surgical consent, pre-op evaluation, timeout performed, IV checked, risks and benefits discussed and monitors and equipment checked  Peripheral Block Prep:  Sterile barriers:cap, gloves, mask and sterile barriers  Prep: ChloraPrep  Patient monitoring: blood pressure monitoring, continuous pulse oximetry and EKG  Peripheral Procedure  Guidance:ultrasound guided  Images:still images obtained    Block Type:popliteal  Injection Technique:single-shot  Needle Type:echogenic  Needle Gauge:18 G  Catheter Size:20 G  Medications  Comment:30ml bupiv with 10ml NS  Local Injected:bupivacaine 0.25% Local Amount Injected:40 (mls)mL  Post Assessment  Injection Assessment: negative aspiration for heme, no paresthesia on injection and incremental injection  Patient Tolerance:comfortable throughout block  Complications:no  Additional Notes  Procedure:                          Right Ipack after SAB placed. USG. Popliteal artery visualized and LA deposited circumferentially around the lower half of the artery. Pt tolerated well. No complications.

## 2017-07-10 NOTE — H&P
Pre-Op H&P (See Recent Office Note On chart)    Review of Systems:  General ROS:  no fever, chills, rashes, No change since last office visit  Cardiovascular ROS: no chest pain or dyspnea on exertion  Respiratory ROS: no cough, shortness of breath, or wheezing    Immunization History:   Influenza:  Yes 2016  Pneumococcal:  Yes < 5 years  Tetanus:  unknown  Past Medical History:   Diagnosis Date   • Anemia    • Arthritis    • Cancer     SKIN - BASALCELL- EAR   • GERD (gastroesophageal reflux disease)    • High cholesterol    • History of transfusion     2016   • Hypertension      Past Surgical History:   Procedure Laterality Date   • COLONOSCOPY W/ POLYPECTOMY     • ENDOSCOPY         Vital Signs:  /74 (BP Location: Right arm, Patient Position: Lying)  Pulse 63  Temp 97.4 °F (36.3 °C) (Temporal Artery )   Resp 20  SpO2 99%    Physical Exam:    CV:  S1S2 regular rate and rhythm, no murmur               Resp:  Clear to auscultation; respirations regular, even and unlabored    Results Review:    I reviewed the patient's new clinical results.    Melissa Treviño, APRN  7/10/2017 8:46 AM

## 2017-07-10 NOTE — ANESTHESIA POSTPROCEDURE EVALUATION
Patient: Kylee Robles    Procedure Summary     Date Anesthesia Start Anesthesia Stop Room / Location    07/10/17 1027  [facility] / [facility]       Procedure Diagnosis Surgeon Provider    RIGHT TOTAL KNEE ARTHROPLASTY  (Right Knee) No diagnosis on file. MD Kelly Pratt MD          Anesthesia Type: MAC, spinal, regional  Last vitals  /43 (07/10/17 1210)    Temp 97.8 °F (36.6 °C) (07/10/17 1210)    Pulse 61 (07/10/17 1210)   Resp 16 (07/10/17 1210)    SpO2 98 % (07/10/17 1210)      Post Anesthesia Care and Evaluation    Patient location during evaluation: PACU  Patient participation: complete - patient participated  Level of consciousness: awake and alert  Pain score: 0  Pain management: adequate  Airway patency: patent  Anesthetic complications: No anesthetic complications  PONV Status: none  Cardiovascular status: hemodynamically stable and acceptable  Respiratory status: nonlabored ventilation, acceptable and nasal cannula  Hydration status: acceptable

## 2017-07-10 NOTE — PLAN OF CARE
Problem: Inpatient Physical Therapy  Goal: Range of Motion Goal LTG- PT  Outcome: Ongoing (interventions implemented as appropriate)    07/10/17 1545   Range of Motion PT LTG   Range of Motion Goal PT LTG, Date Established 07/10/17   Range of Motion Goal PT LTG, Time to Achieve 5 days   Range fo Motion Goal PT LTG, Joint R knee   Range of Motion Goal PT LTG, AAROM Measure 0-90 degrees   Range of Motion Goal PT LTG, Date Goal Reviewed 07/10/17   Range of Motion Goal PT LTG, Outcome goal ongoing

## 2017-07-10 NOTE — ANESTHESIA PROCEDURE NOTES
Spinal Block    Patient location during procedure: OR  Start Time: 7/10/2017 11:01 AM  Stop Time: 7/10/2017 11:01 AM  Indication:at surgeon's request  Performed By  Anesthesiologist: HERMES ROSADO  CRNA: LUIS SANCHEZ  Preanesthetic Checklist  Completed: patient identified, site marked, surgical consent, pre-op evaluation, timeout performed, IV checked, risks and benefits discussed and monitors and equipment checked  Spinal Block Prep:  Patient Position:sitting  Sterile Tech:cap, gloves, sterile barriers and mask  Prep:Chloraprep  Patient Monitoring:blood pressure monitoring, continuous pulse oximetry and EKG  Spinal Block Procedure  Approach:midline  Guidance:landmark technique and palpation technique  Location:L4-L5  Needle Type:Mariah  Needle Gauge:25 G  Placement of Spinal needle event:cerebrospinal fluid aspirated    Fluid Appearance:clear  Post Assessment  Patient Tolerance:patient tolerated the procedure well with no apparent complications  Complications no  Additional Notes  Procedure:  Pt assisted to sitting position, with legs in position of comfort over side of bed.  Pt. instructed in optimal spine presentation, the spine was prepped/ Draped and the skin at insertion site was anesthetized with 1% Lidocaine 2 ml.  The spinal needle was then advanced until CSF flow was obtained and LA was injected:      Marcaine 0.5% PSF injected 12.5 Mg.

## 2017-07-10 NOTE — OP NOTE
TOTAL KNEE ARTHROPLASTY  Procedure Note    Kylee Robles  7/10/2017    Pre-op Diagnosis:   Right knee OA    Post-op Diagnosis:     RIght knee OA    Procedure/CPT® Codes:  25674    Procedure(s):  RIGHT TOTAL KNEE ARTHROPLASTY     Surgeon(s):  Jad Pineda MD    Anesthesia: General    Staff:   Circulator: Loni Hameed RN; Sherron Fierro RN  Scrub Person: Easton Ramos  Vendor Representative: Keyshawn Ross  Nursing Assistant: Vladimir Mclean  Assistant: Maryjo Bradley CSA    Estimated Blood Loss: 0 mL  Urine Voided: 0 mL    Specimens:                * No specimens in log *      Drains:           Findings: High grade osteophytes and restricted motion    Complications: None    Indications: 72-year-old with x-rays showing bilateral tricompartmental bone-on-bone arthrosis. Right knee has end-stage symptoms. Risks and benefits indications and rationale for right total knee arthroplasty discussed at length with patient. After full discussion she wishes to proceed and signed her own consent after all questions are answered.    Procedure: While in the OR anesthesia was started with satisfactory level. In supine position and prepped and draped in standard fashion from midthigh to the ankle using sliding. Tourniquet inflated 300 mmHg. Motion noted approximately 0/20/75. Total tourniquet time approximately 62 minutes. Landmarks identified and standard anterior incision made with medial parapatellar arthrotomy. Patella was everted. Circumferential meniscectomy performed. Anterior cruciate ligament excised. Posterior cruciate ligament was retained. Tibia cut made perpendicular to the mechanical axis. Intramedullary guides used for distal femoral preparation. 4° distal valgus cut made. Anterior-posterior cuts were made 3° externally rotated from the posterior condylar axis. This was roughly orthogonal to Whitesides line. Chamfer and sulcus cuts were made and large osteophytes removed both posterior and at the notch. Tibia  sized to a #5 as had the femur. Tibia was prepared in standard fashion. Trial showed adequate flexion to 120° and extension to neutral with good varus valgus stability and excellent patellar tracking. Trials were removed and bone surfaces thoroughly irrigated. Final components seated with standard cement technique and excess cement removed during the curing process. Final components assembled and reduced and again showed same motion and stability and patellar tracking as with trials. Wound was thoroughly irrigated. Closed in layers without a drain. Standard Prineo skin dressing and then standard compression dressing applied. Patient stable to recovery having tolerated procedure well throughout with all counts correct.    Jad Pineda MD     Date: 7/10/2017  Time: 12:04 PM

## 2017-07-11 LAB
ANION GAP SERPL CALCULATED.3IONS-SCNC: 5 MMOL/L (ref 3–11)
BASOPHILS # BLD AUTO: 0 10*3/MM3 (ref 0–0.2)
BASOPHILS NFR BLD AUTO: 0 % (ref 0–1)
BUN BLD-MCNC: 10 MG/DL (ref 9–23)
BUN/CREAT SERPL: 14.3 (ref 7–25)
CALCIUM SPEC-SCNC: 9.1 MG/DL (ref 8.7–10.4)
CHLORIDE SERPL-SCNC: 104 MMOL/L (ref 99–109)
CO2 SERPL-SCNC: 25 MMOL/L (ref 20–31)
CREAT BLD-MCNC: 0.7 MG/DL (ref 0.6–1.3)
DEPRECATED RDW RBC AUTO: 43.3 FL (ref 37–54)
EOSINOPHIL # BLD AUTO: 0.01 10*3/MM3 (ref 0–0.3)
EOSINOPHIL NFR BLD AUTO: 0.2 % (ref 0–3)
ERYTHROCYTE [DISTWIDTH] IN BLOOD BY AUTOMATED COUNT: 13.6 % (ref 11.3–14.5)
GFR SERPL CREATININE-BSD FRML MDRD: 82 ML/MIN/1.73
GLUCOSE BLD-MCNC: 121 MG/DL (ref 70–100)
HCT VFR BLD AUTO: 31.8 % (ref 34.5–44)
HGB BLD-MCNC: 10.1 G/DL (ref 11.5–15.5)
IMM GRANULOCYTES # BLD: 0.01 10*3/MM3 (ref 0–0.03)
IMM GRANULOCYTES NFR BLD: 0.2 % (ref 0–0.6)
LYMPHOCYTES # BLD AUTO: 0.88 10*3/MM3 (ref 0.6–4.8)
LYMPHOCYTES NFR BLD AUTO: 13.7 % (ref 24–44)
MCH RBC QN AUTO: 27.4 PG (ref 27–31)
MCHC RBC AUTO-ENTMCNC: 31.8 G/DL (ref 32–36)
MCV RBC AUTO: 86.4 FL (ref 80–99)
MONOCYTES # BLD AUTO: 1.13 10*3/MM3 (ref 0–1)
MONOCYTES NFR BLD AUTO: 17.5 % (ref 0–12)
NEUTROPHILS # BLD AUTO: 4.41 10*3/MM3 (ref 1.5–8.3)
NEUTROPHILS NFR BLD AUTO: 68.4 % (ref 41–71)
PLATELET # BLD AUTO: 205 10*3/MM3 (ref 150–450)
PMV BLD AUTO: 10 FL (ref 6–12)
POTASSIUM BLD-SCNC: 3.6 MMOL/L (ref 3.5–5.5)
RBC # BLD AUTO: 3.68 10*6/MM3 (ref 3.89–5.14)
SODIUM BLD-SCNC: 134 MMOL/L (ref 132–146)
WBC NRBC COR # BLD: 6.44 10*3/MM3 (ref 3.5–10.8)

## 2017-07-11 PROCEDURE — 97110 THERAPEUTIC EXERCISES: CPT

## 2017-07-11 PROCEDURE — 94640 AIRWAY INHALATION TREATMENT: CPT

## 2017-07-11 PROCEDURE — 97116 GAIT TRAINING THERAPY: CPT

## 2017-07-11 PROCEDURE — 80048 BASIC METABOLIC PNL TOTAL CA: CPT | Performed by: NURSE PRACTITIONER

## 2017-07-11 PROCEDURE — 25010000003 CEFAZOLIN IN DEXTROSE 2-4 GM/100ML-% SOLUTION: Performed by: ORTHOPAEDIC SURGERY

## 2017-07-11 PROCEDURE — 85025 COMPLETE CBC W/AUTO DIFF WBC: CPT | Performed by: ORTHOPAEDIC SURGERY

## 2017-07-11 PROCEDURE — 97530 THERAPEUTIC ACTIVITIES: CPT | Performed by: OCCUPATIONAL THERAPIST

## 2017-07-11 PROCEDURE — 97166 OT EVAL MOD COMPLEX 45 MIN: CPT | Performed by: OCCUPATIONAL THERAPIST

## 2017-07-11 PROCEDURE — 25010000002 ROPIVACAINE PER 1 MG: Performed by: NURSE ANESTHETIST, CERTIFIED REGISTERED

## 2017-07-11 RX ORDER — IPRATROPIUM BROMIDE AND ALBUTEROL SULFATE 2.5; .5 MG/3ML; MG/3ML
3 SOLUTION RESPIRATORY (INHALATION) EVERY 6 HOURS PRN
Status: DISCONTINUED | OUTPATIENT
Start: 2017-07-11 | End: 2017-07-12 | Stop reason: HOSPADM

## 2017-07-11 RX ADMIN — HYDROCODONE BITARTRATE AND ACETAMINOPHEN 1 TABLET: 5; 325 TABLET ORAL at 08:16

## 2017-07-11 RX ADMIN — ROPIVACAINE HYDROCHLORIDE 12 ML/HR: 2 INJECTION, SOLUTION EPIDURAL; INFILTRATION at 18:45

## 2017-07-11 RX ADMIN — PROPRANOLOL HYDROCHLORIDE 120 MG: 60 CAPSULE, EXTENDED RELEASE ORAL at 08:16

## 2017-07-11 RX ADMIN — DOCUSATE SODIUM 100 MG: 100 CAPSULE, LIQUID FILLED ORAL at 17:44

## 2017-07-11 RX ADMIN — ATORVASTATIN CALCIUM 10 MG: 10 TABLET, FILM COATED ORAL at 08:16

## 2017-07-11 RX ADMIN — FAMOTIDINE 20 MG: 20 TABLET ORAL at 08:16

## 2017-07-11 RX ADMIN — IPRATROPIUM BROMIDE AND ALBUTEROL SULFATE 3 ML: .5; 3 SOLUTION RESPIRATORY (INHALATION) at 07:53

## 2017-07-11 RX ADMIN — DOCUSATE SODIUM 100 MG: 100 CAPSULE, LIQUID FILLED ORAL at 08:16

## 2017-07-11 RX ADMIN — HYDROCODONE BITARTRATE AND ACETAMINOPHEN 1 TABLET: 5; 325 TABLET ORAL at 03:17

## 2017-07-11 RX ADMIN — HYDROCODONE BITARTRATE AND ACETAMINOPHEN 1 TABLET: 5; 325 TABLET ORAL at 21:52

## 2017-07-11 RX ADMIN — LOSARTAN POTASSIUM 100 MG: 50 TABLET, FILM COATED ORAL at 08:16

## 2017-07-11 RX ADMIN — CEFAZOLIN SODIUM 2 G: 2 INJECTION, SOLUTION INTRAVENOUS at 03:15

## 2017-07-11 RX ADMIN — FAMOTIDINE 20 MG: 20 TABLET ORAL at 17:44

## 2017-07-11 RX ADMIN — ROPIVACAINE HYDROCHLORIDE 12 ML/HR: 2 INJECTION, SOLUTION EPIDURAL; INFILTRATION at 03:20

## 2017-07-11 RX ADMIN — ASPIRIN 325 MG: 325 TABLET, COATED ORAL at 08:16

## 2017-07-11 NOTE — THERAPY EVALUATION
Acute Care - Occupational Therapy Initial Evaluation  [facility]     Patient Name: Kylee Robles  : 1945  MRN: [id number]  Today's Date: 2017  Onset of Illness/Injury or Date of Surgery Date: 07/10/17  Date of Referral to OT: 17  Referring Physician: MD Edwin    Admit Date: 7/10/2017       ICD-10-CM ICD-9-CM   1. Impaired functional mobility, balance, gait, and endurance Z74.09 V49.89   2. Impaired mobility and ADLs Z74.09 799.89     Patient Active Problem List   Diagnosis   • Arthritis of knee, right   • Status post total right knee replacement   • GERD (gastroesophageal reflux disease)   • Hyperlipidemia   • HTN (hypertension)   • COPD (chronic obstructive pulmonary disease)     Past Medical History:   Diagnosis Date   • Anemia    • Arthritis    • Cancer     SKIN - BASALCELL- EAR   • GERD (gastroesophageal reflux disease)    • High cholesterol    • History of transfusion        • Hypertension      Past Surgical History:   Procedure Laterality Date   • COLONOSCOPY W/ POLYPECTOMY     • ENDOSCOPY     • ND TOTAL KNEE ARTHROPLASTY Right 7/10/2017    Procedure: RIGHT TOTAL KNEE ARTHROPLASTY ;  Surgeon: Jad Pineda MD;  Location: [facility];  Service: Orthopedics          OT ASSESSMENT FLOWSHEET (last 72 hours)      OT Evaluation       17 1147 17 1146 17 1040 17 0708 07/10/17 2326    Rehab Evaluation    Document Type   therapy note (daily note)  -LM evaluation;therapy note (daily note)  -ST     Subjective Information   agree to therapy;complains of;pain  -LM no complaints;agree to therapy  -ST     Patient Effort, Rehab Treatment   good  -LM good  -ST     Symptoms Noted During/After Treatment   fatigue  -LM none  -ST     General Information    Patient Profile Review    yes  -ST     Onset of Illness/Injury or Date of Surgery Date    07/10/17  -ST     Referring Physician    MD Edwin  -ST     General Observations    Pt sitting EOB upon arrival; family present; nerve cath  intact  -ST     Pertinent History Of Current Problem    Pt presents for sx management of R knee pain and dysfunction; s/p R TKA; use of adductor canal cath  -ST     Precautions/Limitations   fall precautions;other (see comments)   R adductor canal nerve catheter  -LM fall precautions;other (see comments)   adductor canal cath  -ST     Prior Level of Function    independent:;all household mobility;community mobility;feeding;grooming;home management;min assist:;dressing;bathing   pain and increased time to complete ADLs  -ST     Equipment Currently Used at Home cane, straight;walker, rolling;commode;raised toilet  -RS   cane, straight;walker, rolling;commode  -ST     Plans/Goals Discussed With    patient and family;agreed upon  -ST     Risks Reviewed    patient and family:;LOB;nausea/vomiting;dizziness;increased discomfort;change in vital signs  -ST     Benefits Reviewed    patient and family:;improve function;increase independence;increase strength;increase balance;decrease pain;increase knowledge  -ST     Barriers to Rehab    previous functional deficit  -ST     Living Environment    Lives With child(joe), adult  -RS   other relative(s) (specify)  -ST     Living Arrangements house  -RS   house  -ST     Home Accessibility bed and bath on same level;stairs to enter home  -RS   tub/shower is not walk in  -ST     Number of Stairs to Enter Home 2  -RS        Transportation Available car;family or friend will provide  -RS        Living Environment Comment    family available prn; per pt report, tub bench will not fit in tub  -ST     Clinical Impression    Date of Referral to OT    07/11/17  -ST     OT Diagnosis    impaired ADL performance  -ST     Prognosis    good  -ST     Patient/Family Goals Statement    return home  -ST     Criteria for Skilled Therapeutic Interventions Met    yes  -ST     Rehab Potential    good, to achieve stated therapy goals  -ST     Therapy Frequency    daily  -ST     Anticipated Equipment Needs  At Discharge    --   TBD; TKR bag issued except sock aid (does not wear socks)  -ST     Functional Level Prior    Ambulation  1-->assistive equipment  -RS   1-->assistive equipment  -KB    Transferring  1-->assistive equipment  -RS   1-->assistive equipment  -KB    Toileting  0-->independent  -RS   0-->independent  -KB    Bathing  0-->independent  -RS   0-->independent  -KB    Dressing  0-->independent  -RS   0-->independent  -KB    Eating  0-->independent  -RS   0-->independent  -KB    Communication  0-->understands/communicates without difficulty  -RS   0-->understands/communicates without difficulty  -KB    Swallowing  0-->swallows foods/liquids without difficulty  -RS   0-->swallows foods/liquids without difficulty  -KB    Pain Assessment    Pain Assessment   0-10  -LM 0-10  -ST     Pain Score   6  -LM 5  -ST     Post Pain Score   0  -LM 6  -ST     Pain Type   Acute pain  -LM Acute pain  -ST     Pain Location   Knee  -LM Knee  -ST     Pain Orientation   Right;Anterior  -LM Right;Anterior  -ST     Pain Intervention(s)   Repositioned;Ambulation/increased activity  -LM Repositioned;Ambulation/increased activity  -ST     Vision Assessment/Intervention    Visual Impairment   WFL  -LM WNL  -ST     Cognitive Assessment/Intervention    Current Cognitive/Communication Assessment   functional  -LM functional  -ST     Orientation Status   oriented x 4  -LM oriented x 4  -ST     Follows Commands/Answers Questions   100% of the time  -% of the time  -ST     Personal Safety   WNL/WFL  -LM WNL/WFL  -ST     Personal Safety Interventions    fall prevention program maintained;gait belt;nonskid shoes/slippers when out of bed  -ST     ROM (Range of Motion)    General ROM    no range of motion deficits identified  -ST     MMT (Manual Muscle Testing)    General MMT Assessment Detail    fxnl for ADL performance  -ST     Mobility Assessment/Training    Extremity Weight-Bearing Status   right lower extremity  -LM right lower  extremity  -ST     Right Lower Extremity Weight-Bearing   weight-bearing as tolerated  -LM weight-bearing as tolerated  -ST     Bed Mobility, Assessment/Treatment    Bed Mob, Supine to Sit, [place]   not tested   Pt received sitting UIC  -LM      Bed Mob, Sit to Supine, [place]   not tested   Pt left sitting UIC  -LM      Bed Mobility, Comment    sitting EOB upon arrival  -ST     Transfer Assessment/Treatment    Transfers, Sit-Stand [place]   minimum assist (75% patient effort);verbal cues required  -LM moderate assist (50% patient effort)   vc'ing for hand placement, scooting to edge of surface  -ST     Transfers, Stand-Sit [place]   contact guard assist;verbal cues required  -LM contact guard assist  -ST     Transfers, Sit-Stand-Sit, Assist Device   rolling walker  -LM rolling walker  -ST     Toilet Transfer, [place]    minimum assist (75% patient effort)  -ST     Toilet Transfer, Assistive Device    elevated toilet seat;rolling walker  -ST     Transfer, Comment   Verbal cues to push from chair armrests when standing. Pt experienced posterior lean when standing; required increased assistance to maintain upright position. Verbal cues to reach for chair armrests prior to sitting and step out RLE.  -LM 2 trials to attempt from EOB-not successful, 3rd attempt, requiring OT to raise bed with significant height   -ST     Functional Mobility    Functional Mobility- Ind. Level    contact guard assist  -ST     Functional Mobility- Device    rolling walker  -ST     Functional Mobility-Distance (Feet)    15  -ST     Functional Mobility- Comment    forward flexed position, cuing for posture and keeping rwx safe distance to self  -ST     Lower Body Dressing Assessment/Training    LB Dressing Assess/Train, Clothing Type    doffing:;donning:   underwear  -ST     LB Dressing Assess/Train, Assist Device    reacher  -ST     LB Dressing Assess/Train, Position    sitting  -ST     LB Dressing Assess/Train,  [place]    conditional independence  -ST     LB Dressing Assess/Train, Impairments    ROM decreased  -ST     Toileting Assessment/Training    Toileting Assess/Train, Assistive Device    raised toilet seat  -ST     Toileting Assess/Train, Position    sitting  -ST     Toileting Assess/Train, Comment    able to complete toileting, hygiene and clothing management with SUA  -ST     Grooming Assessment/Training    Grooming Assess/Train, Comment    SUA for hand hygiene SS  -ST     Therapy Exercises    Exercise Protocols   total knee  -LM      Total Knee Exercises   right:;15 reps;completed protocol;with assist;quad set;glut set;heel slide stretch;SLR;SAQ;LAQ;ankle pumps/circles   Jacqueline with SLR  -LM      General Therapy Interventions    Planned Therapy Interventions    ADL retraining;IADL retraining;balance training;bed mobility training;transfer training  -ST     Adaptive Equipment Training    initiated AE training  -ST     Positioning and Restraints    Pre-Treatment Position   sitting in chair/recliner  -LM in bed  -ST     Post Treatment Position   chair  -LM chair  -ST     In Chair   notified nsg;reclined;sitting;call light within reach;encouraged to call for assist;with family/caregiver   SCDs  -LM notified nsg;reclined;call light within reach;encouraged to call for assist;with family/caregiver  -ST       07/10/17 1545 07/10/17 0756             Rehab Evaluation    Document Type evaluation  -LR        Subjective Information agree to therapy;complains of;pain;nausea/vomiting  -LR        Patient Effort, Rehab Treatment good  -LR        Symptoms Noted During/After Treatment increased pain;other (see comments)   nausea  -LR        Symptoms Noted Comment Notified RN.   -LR        General Information    Patient Profile Review yes  -LR        Onset of Illness/Injury or Date of Surgery Date 07/10/17  -LR        Referring Physician MD Edwin  -LR        General Observations Patient supine in bed upon arrival with several  family members present. IV, R adductor canal nerve catheter, R knee ace bandaged, SCDs.   -LR        Pertinent History Of Current Problem Patient presents for surgical management of persistent and progressive R knee pain and dysfunction that has failed to improve with conservative managefment.   -LR        Precautions/Limitations fall precautions;other (see comments)   R adductor canal nerve catheter; nausea  -LR        Prior Level of Function min assist:;all household mobility;community mobility;gait;transfer;bed mobility;home management;cooking;cleaning;shopping;using stairs;independent:;driving   SPC for all mobility,required rest breaks for clean/distance  -LR        Equipment Currently Used at Home cane, straight;walker, rolling;commode   used SPC at all times for mobility  -LR cane, straight;raised toilet  -NM       Plans/Goals Discussed With patient and family;agreed upon  -LR        Risks Reviewed patient and family:;LOB;nausea/vomiting;dizziness;increased discomfort  -LR        Benefits Reviewed patient and family:;improve function;increase independence;increase strength;increase balance;decrease pain;increase knowledge  -LR        Barriers to Rehab previous functional deficit;medically complex  -LR        Living Environment    Lives With other relative(s) (specify)   granddaughter  -LR other relative(s) (specify)  -NM       Living Arrangements house  -LR house  -NM       Home Accessibility bed and bath on same level;house is not wheelchair accessible;stairs to enter home;tub/shower is not walk in  -LR        Number of Stairs to Enter Home 2  -LR        Stair Railings at Home outside, present on left side   post on L side of porch, uses as handrail.   -LR        Type of Financial/Environmental Concern none  -LR        Transportation Available family or friend will provide  -LR car;family or friend will provide  -NM       Living Environment Comment Patient reports someone with her family will be with her at  all times to assist upon d/c home.   -LR        Functional Level Prior    Ambulation  1-->assistive equipment  -NM       Transferring  1-->assistive equipment  -NM       Toileting  0-->independent  -NM       Bathing  0-->independent  -NM       Dressing  0-->independent  -NM       Eating  0-->independent  -NM       Communication  0-->understands/communicates without difficulty  -NM       Swallowing  0-->swallows foods/liquids without difficulty  -NM       Pain Assessment    Pain Assessment 0-10  -LR        Pain Score 6  -LR        Post Pain Score 8  -LR        Pain Type Acute pain  -LR        Pain Location Knee  -LR        Pain Orientation Right;Anterior  -LR        Pain Intervention(s) Repositioned;Ambulation/increased activity  -LR        Vision Assessment/Intervention    Visual Impairment WFL  -LR        Cognitive Assessment/Intervention    Current Cognitive/Communication Assessment functional  -LR        Orientation Status oriented x 4;required verbal cueing (specifiy in comments)  -LR        Follows Commands/Answers Questions 100% of the time;able to follow single-step instructions;needs cueing;needs repetition  -LR        Personal Safety WNL/WFL  -LR        ROM (Range of Motion)    General ROM lower extremity range of motion deficits identified  -LR        MMT (Manual Muscle Testing)    General MMT Assessment lower extremity strength deficits identified  -LR        Mobility Assessment/Training    Extremity Weight-Bearing Status right lower extremity  -LR        Right Lower Extremity Weight-Bearing weight-bearing as tolerated  -LR        Bed Mobility, Assessment/Treatment    Bed Mobility, Assistive Device head of bed elevated;bed rails  -LR        Bed Mob, Supine to Sit, [place] verbal cues required;supervision required  -LR        Bed Mob, Sit to Supine, [place] not tested   UI at end of eval.   -LR        Bed Mobility, Safety Issues decreased use of legs for bridging/pushing  -LR        Bed  Mobility, Impairments ROM decreased;strength decreased;pain  -LR        Bed Mobility, Comment Verbal cues to move LEs towards EOB and to push up from bed from behind to raise trunk into sitting and to scoot hips out to get feet on floor. Patient reported nausea improved once sitting upright.   -LR        Transfer Assessment/Treatment    Transfers, Sit-Stand [place] verbal cues required;moderate assist (50% patient effort);2 person assist required  -LR        Transfers, Stand-Sit [place] verbal cues required;contact guard assist;2 person assist required  -LR        Transfers, Sit-Stand-Sit, Assist Device rolling walker  -LR        Toilet Transfer, [place] verbal cues required;minimum assist (75% patient effort);1 person + 1 person to manage equipment  -LR        Toilet Transfer, Assistive Device rolling walker;bedside commode without drop arms  -LR        Transfer, Safety Issues sequencing ability decreased;step length decreased;weight-shifting ability decreased  -LR        Transfer, Impairments ROM decreased;strength decreased;pain  -LR        Transfer, Comment Verbal cues to step R LE out before t/f and to pull L foot under her to stand on. Cues to push up from bed to stand and to reach back for chair to lower into sitting. Required significant assist to stand from bed. Assisted to and from bathroom. Verbal cues to use armrests of BSC for UE support when t/f on and off toilet. Less assist required to stand from toilet.   -LR        Therapy Exercises    Exercise Protocols total knee  -LR        Total Knee Exercises right:;10 reps;ankle pumps/circles;quad set;glut set   cues for technique  -LR        Sensory Assessment/Intervention    Sensory Impairment --   c/o mild numbness from spinal; able to actively DF bilateral  -LR        Positioning and Restraints    Pre-Treatment Position in bed  -LR        Post Treatment Position chair  -LR        In Chair notified nsg;reclined;sitting;call light within  reach;encouraged to call for assist;with family/caregiver;legs elevated;compression device  -LR        General LE Assessment    ROM LLE ROM was WFL;knee, right: LE ROM deficit  -LR        ROM Detail R knee AROM impaired 25%  -LR        Lower Extremity    Lower Ext Manual Muscle Testing left LE strength is WFL;right knee strength deficit  -LR        Lower Ext Manual Muscle Testing Detail R knee functionally 4-/5  -LR          User Key  (r) = Recorded By, (t) = Taken By, (c) = Cosigned By    Initials Name Effective Dates    ST Marleni Yeager, OTR 02/20/17 -     LR Cindy Mcgovern, PT 06/19/15 -     RS Tawanna Barroso V 05/02/16 -     NM Helen Lew, RN 06/16/16 -     KB Kalani Denny, RN 06/16/16 -     LM Chloé Justice, PT 06/09/17 -            Occupational Therapy Education     Title: PT OT SLP Therapies (Done)     Topic: Occupational Therapy (Done)     Point: ADL training (Done)    Description: Instruct learner(s) on proper safety adaptation and remediation techniques during self care or transfers.   Instruct in proper use of assistive devices.    Learning Progress Summary    Learner Readiness Method Response Comment Documented by Status   Patient Acceptance E,TB,CHRISTIAN,H VU,DU role of OT, benefits of activity, transfers, ADLs, LBD, AE, toileting ST 07/11/17 1308 Done   Family Acceptance E,TB,CHRISTIAN,H VU,DU role of OT, benefits of activity, transfers, ADLs, LBD, AE, toileting ST 07/11/17 1308 Done               Point: Home exercise program (Done)    Description: Instruct learner(s) on appropriate technique for monitoring, assisting and/or progressing therapeutic exercises/activities.    Learning Progress Summary    Learner Readiness Method Response Comment Documented by Status   Patient Acceptance E,TB,D,H VU,DU role of OT, benefits of activity, transfers, ADLs, LBD, AE, toileting ST 07/11/17 1308 Done   Family Acceptance E,TB,D,H VU,DU role of OT, benefits of activity, transfers, ADLs, LBD, AE, toileting ST  07/11/17 1308 Done               Point: Precautions (Done)    Description: Instruct learner(s) on prescribed precautions during self-care and functional transfers.    Learning Progress Summary    Learner Readiness Method Response Comment Documented by Status   Patient Acceptance E,TB,D,H VU,DU role of OT, benefits of activity, transfers, ADLs, LBD, AE, toileting ST 07/11/17 1308 Done   Family Acceptance E,TB,D,H VU,DU role of OT, benefits of activity, transfers, ADLs, LBD, AE, toileting ST 07/11/17 1308 Done               Point: Body mechanics (Done)    Description: Instruct learner(s) on proper positioning and spine alignment during self-care, functional mobility activities and/or exercises.    Learning Progress Summary    Learner Readiness Method Response Comment Documented by Status   Patient Acceptance E,TB,D,H VU,DU role of OT, benefits of activity, transfers, ADLs, LBD, AE, toileting ST 07/11/17 1308 Done   Family Acceptance E,TB,D,H VU,DU role of OT, benefits of activity, transfers, ADLs, LBD, AE, toileting ST 07/11/17 1308 Done                      User Key     Initials Effective Dates Name Provider Type Discipline    ST 02/20/17 -  Marleni Yeager, OTR Occupational Therapist OT                  OT Recommendation and Plan  Anticipated Equipment Needs At Discharge:  (TBD; TKR bag issued except sock aid (does not wear socks))  Planned Therapy Interventions: ADL retraining, IADL retraining, balance training, bed mobility training, transfer training  Therapy Frequency: daily  Plan of Care Review  Plan Of Care Reviewed With: patient  Outcome Summary/Follow up Plan: Pt presents with good pain control and understanding of safety s/p TKA; deficits however in transfers and ADL tasks d/t limitations in knee ROM and fatigue. will progress with independence and safety in preparation for d/c home.           OT Goals       07/11/17 0708          Bed Mobility OT LTG    Bed Mobility OT LTG, Time to Achieve 4 days  -ST       Bed Mobility OT LTG, Activity Type supine to sit/sit to supine  -ST      Bed Mobility OT LTG, [place] Level supervision required  -ST      Bed Mobility OT LTG, Assist Device leg ;bed rails  -ST      Bed Mobility OT LTG, Outcome goal ongoing  -ST      Transfer Training OT LTG    Transfer Training OT LTG, Time to Achieve 4 days  -ST      Transfer Training OT LTG, Activity Type tub  -ST      Transfer Training OT LTG, [place] Level contact guard assist  -ST      Transfer Training OT LTG, Assist Device walker, rolling  -ST      Transfer Training OT LTG, Outcome goal ongoing  -ST      LB Dressing OT LTG    LB Dressing Goal OT LTG, Time to Achieve 4 days  -ST      LB Dressing Goal OT LTG, [place] Level conditional independence  -ST      LB Dressing Goal OT LTG, Adaptive Equipment laces, elastic;reacher;shoe horn, long handled;sock-aid  -ST      LB Dressing Goal OT LTG, Additional Goal address shoes  -ST      LB Dressing Goal OT LTG, Outcome goal ongoing  -ST        User Key  (r) = Recorded By, (t) = Taken By, (c) = Cosigned By    Initials Name Provider Type    DEMETRIS Harper Occupational Therapist                Outcome Measures       07/11/17 1040 07/11/17 0708 07/10/17 1545    How much help from another person do you currently need...    Turning from your back to your side while in flat bed without using bedrails? 3  -LM  3  -LR    Moving from lying on back to sitting on the side of a flat bed without bedrails? 3  -LM  3  -LR    Moving to and from a bed to a chair (including a wheelchair)? 3  -LM  3  -LR    Standing up from a chair using your arms (e.g., wheelchair, bedside chair)? 3  -LM  2  -LR    Climbing 3-5 steps with a railing? 2  -LM  2  -LR    To walk in hospital room? 3  -LM  3  -LR    AM-PAC 6 Clicks Score 17  -LM  16  -LR    How much help from another is currently needed...    Putting on and taking off regular lower body clothing?  3  -ST     Bathing (including washing, rinsing,  and drying)  3  -ST     Toileting (which includes using toilet bed pan or urinal)  3  -ST     Putting on and taking off regular upper body clothing  4  -ST     Taking care of personal grooming (such as brushing teeth)  4  -ST     Eating meals  4  -ST     Score  21  -ST     Functional Assessment    Outcome Measure Options AM-PAC 6 Clicks Basic Mobility (PT)  -LM AM-PAC 6 Clicks Daily Activity (OT)  -ST AM-PAC 6 Clicks Basic Mobility (PT)  -LR      User Key  (r) = Recorded By, (t) = Taken By, (c) = Cosigned By    Initials Name Provider Type     Marleni Yeager OTR Occupational Therapist    LR Cindy Mcgovern, PT Physical Therapist    LM Chloé Justice, PT Physical Therapist          Time Calculation:   OT Start Time: 0708    Therapy Charges for Today     Code Description Service Date Service Provider Modifiers Qty    40889344700  OT THERAPEUTIC ACT EA 15 MIN 7/11/2017 Marleni Yeager OTR GO 2    [id number]  OT EVAL MOD COMPLEXITY 2 7/11/2017 DEMETRIS Roberts GO 1               DEMETRIS Borges  7/11/2017

## 2017-07-11 NOTE — PLAN OF CARE
Problem: Patient Care Overview (Adult)  Goal: Plan of Care Review  Outcome: Ongoing (interventions implemented as appropriate)    07/11/17 0421   Coping/Psychosocial Response Interventions   Plan Of Care Reviewed With patient   Patient Care Overview   Progress no change   Outcome Evaluation   Outcome Summary/Follow up Plan Patient ambulating with assist x1. VSS. Voiding well. Pain well controlled with PO meds and ropivacaine at 12ml/hr.          Problem: Perioperative Period (Adult)  Goal: Signs and Symptoms of Listed Potential Problems Will be Absent or Manageable (Perioperative Period)  Outcome: Ongoing (interventions implemented as appropriate)    Problem: Knee Replacement, Total (Adult)  Goal: Signs and Symptoms of Listed Potential Problems Will be Absent or Manageable (Knee Replacement, Total)  Outcome: Ongoing (interventions implemented as appropriate)

## 2017-07-11 NOTE — THERAPY TREATMENT NOTE
Acute Care - Physical Therapy Treatment Note  [facility]     Patient Name: Kylee Robles  : 1945  MRN: [id number]  Today's Date: 2017  Onset of Illness/Injury or Date of Surgery Date: 07/10/17  Date of Referral to PT: 07/10/17  Referring Physician: MD Edwin    Admit Date: 7/10/2017    Visit Dx:    ICD-10-CM ICD-9-CM   1. Impaired functional mobility, balance, gait, and endurance Z74.09 V49.89   2. Impaired mobility and ADLs Z74.09 799.89     Patient Active Problem List   Diagnosis   • Arthritis of knee, right   • Status post total right knee replacement   • GERD (gastroesophageal reflux disease)   • Hyperlipidemia   • HTN (hypertension)   • COPD (chronic obstructive pulmonary disease)               Adult Rehabilitation Note       17 1416 17 1040       Rehab Assessment/Intervention    Discipline physical therapist  -LM physical therapist  -LM     Document Type therapy note (daily note)  -LM therapy note (daily note)  -LM     Subjective Information agree to therapy;complains of;pain  -LM agree to therapy;complains of;pain  -LM     Patient Effort, Rehab Treatment good  -LM good  -LM     Symptoms Noted During/After Treatment fatigue  -LM fatigue  -LM     Precautions/Limitations fall precautions;other (see comments)   R adductor canal nerve catheter  -LM fall precautions;other (see comments)   R adductor canal nerve catheter  -LM     Recorded by [LM] Chloé Justice, PT [LM] Chloé Justice PT     Pain Assessment    Pain Assessment 0-10  -LM 0-10  -LM     Pain Score 6  -LM 6  -LM     Post Pain Score 6  -LM 0  -LM     Pain Type Acute pain  -LM Acute pain  -LM     Pain Location Knee  -LM Knee  -LM     Pain Orientation Right;Anterior  -LM Right;Anterior  -LM     Pain Intervention(s) Repositioned;Ambulation/increased activity;Cold applied  -LM Repositioned;Ambulation/increased activity  -LM     Recorded by [LM] Chloé Justice, PT [LM] Chloé Justice PT     Vision Assessment/Intervention     Visual Impairment WFL  -LM WFL  -LM     Recorded by [LM] Chloé Justice PT [LM] Chloé Justice PT     Cognitive Assessment/Intervention    Current Cognitive/Communication Assessment functional  -LM functional  -LM     Orientation Status oriented x 4  -LM oriented x 4  -LM     Follows Commands/Answers Questions 100% of the time;able to follow single-step instructions;needs cueing  -% of the time  -LM     Personal Safety WNL/WFL  -LM WNL/WFL  -LM     Recorded by [LM] Chloé Justice PT [LM] Chloé Justice PT     General LE Assessment    ROM Detail R knee measured 16-72 degrees; pt lacking 16 degrees knee extension. AAROM R knee flexion measured in sitting  -LM      Recorded by [LM] Chloé Justice PT      Mobility Assessment/Training    Extremity Weight-Bearing Status right lower extremity  -LM right lower extremity  -LM     Right Lower Extremity Weight-Bearing weight-bearing as tolerated  -LM weight-bearing as tolerated  -LM     Recorded by [LM] Chloé Justice PT [LM] Chloé Justice PT     Bed Mobility, Assessment/Treatment    Bed Mob, Supine to Sit, [place] not tested   Pt received sitting UIC  -LM not tested   Pt received sitting UIC  -LM     Bed Mob, Sit to Supine, [place] not tested   Pt left sitting UIC  -LM not tested   Pt left sitting UIC  -LM     Recorded by [LM] Chloé Justice PT [LM] Chloé Justice PT     Transfer Assessment/Treatment    Transfers, Sit-Stand [place] contact guard assist;verbal cues required  -LM minimum assist (75% patient effort);verbal cues required  -LM     Transfers, Stand-Sit [place] contact guard assist;verbal cues required  -LM contact guard assist;verbal cues required  -LM     Transfers, Sit-Stand-Sit, Assist Device rolling walker  -LM rolling walker  -LM     Toilet Transfer, [place] contact guard assist;verbal cues required  -LM      Toilet Transfer, Assistive Device bedside commode without drop arms;other (see comments)   C placed  over commode  -LM      Transfer, Comment Verbal cues to push from chair armrests when standing, reach for armrests when sitting. Pt demo increased independence with stepping out surgical leg prior to sitting.  -LM Verbal cues to push from chair armrests when standing. Pt experienced posterior lean when standing; required increased assistance to maintain upright position. Verbal cues to reach for chair armrests prior to sitting and step out RLE.  -LM     Recorded by [LM] Chloé Justice PT [LM] Chloé Justice PT     Gait Assessment/Treatment    Gait, [place] Level contact guard assist;verbal cues required  -LM contact guard assist;verbal cues required  -LM     Gait, Assistive Device rolling walker  -LM rolling walker  -LM     Gait, Distance (Feet) 150  -LM 92  -LM     Gait, Gait Pattern Analysis swing-through gait  -LM swing-through gait  -LM     Gait, Gait Deviations right:;antalgic;alley decreased;decreased heel strike;forward flexed posture;step length decreased;toe-to-floor clearance decreased;weight-shifting ability decreased  -LM right:;antalgic;alley decreased;decreased heel strike;forward flexed posture;step length decreased;toe-to-floor clearance decreased;weight-shifting ability decreased  -LM     Gait, Safety Issues sequencing ability decreased;step length decreased;weight-shifting ability decreased  -LM sequencing ability decreased;step length decreased;weight-shifting ability decreased  -LM     Gait, Impairments decreased flexibility;strength decreased;pain  -LM ROM decreased;strength decreased;pain  -LM     Gait, Comment Pt initially ambulated with step-to gait pattern then progressed to step-through gait pattern with verbal cues and practice. Pt requires repetitive cues for upright posture.   -LM Pt ambulated with step-to gait pattern and progressed to step-through gait pattern with verbal cues and practice. Verbal cues to remain within RW bounds and not pushing it too far forward. Pt with  forward flexed posture, repetitive cues for upright posture. Pt expressed decrease in pain and stiffness in R knee with ambulation. Gait limited by fatigue.  -LM     Recorded by [LM] Chloé Justice PT [LM] Chloé Justice PT     Therapy Exercises    Exercise Protocols total knee  -LM total knee  -LM     Total Knee Exercises right:;15 reps;completed protocol;with assist;ankle pumps/circles;quad set;glut set;heel slide stretch;SLR;SAQ;LAQ   Jacqueline SLR  -LM right:;15 reps;completed protocol;with assist;quad set;glut set;heel slide stretch;SLR;SAQ;LAQ;ankle pumps/circles   Jacqueline with SLR  -LM     Recorded by [LM] Chloé Justice PT [LM] Chloé Justice PT     Positioning and Restraints    Pre-Treatment Position sitting in chair/recliner  -LM sitting in chair/recliner  -LM     Post Treatment Position chair  -LM chair  -LM     In Chair notified nsg;reclined;sitting;call light within reach;encouraged to call for assist;with family/caregiver   cold pack  -LM notified nsg;reclined;sitting;call light within reach;encouraged to call for assist;with family/caregiver   SCDs  -LM     Recorded by [LM] Chloé Justice PT [LM] Chloé Justice PT       User Key  (r) = Recorded By, (t) = Taken By, (c) = Cosigned By    Initials Name Effective Dates    LM Chloé Justice PT 06/09/17 -                 IP PT Goals       07/11/17 1500 07/11/17 1115 07/10/17 1545    Bed Mobility PT LTG    Bed Mobility PT LTG, Date Established   07/10/17  -LR    Bed Mobility PT LTG, Time to Achieve   5 days  -LR    Bed Mobility PT LTG, Activity Type   supine to sit/sit to supine  -LR    Bed Mobility PT LTG, [place] Level   conditional independence  -LR    Bed Mobility PT LTG, Date Goal Reviewed 07/11/17  -LM 07/11/17  -LM 07/10/17  -LR    Bed Mobility PT LTG, Outcome goal ongoing  -LM goal ongoing  -LM goal ongoing  -LR    Transfer Training PT LTG    Transfer Training PT LTG, Date Established   07/10/17  -LR    Transfer Training PT LTG, Time to  Achieve   5 days  -LR    Transfer Training PT LTG, Activity Type   sit to stand/stand to sit  -LR    Transfer Training PT LTG, [place] Level   conditional independence  -LR    Transfer Training PT LTG, Assist Device   walker, rolling  -LR    Transfer Training PT  LTG, Date Goal Reviewed 07/11/17  -LM 07/11/17  -LM 07/10/17  -LR    Transfer Training PT LTG, Outcome goal ongoing  -LM goal ongoing  -LM goal ongoing  -LR    Gait Training PT LTG    Gait Training Goal PT LTG, Date Established   07/10/17  -LR    Gait Training Goal PT LTG, Time to Achieve   5 days  -LR    Gait Training Goal PT LTG, [place] Level   conditional independence  -LR    Gait Training Goal PT LTG, Assist Device   walker, rolling  -LR    Gait Training Goal PT LTG, Distance to Achieve   500 feet  -LR    Gait Training Goal PT LTG, Date Goal Reviewed 07/11/17  -LM 07/11/17  -LM 07/10/17  -LR    Gait Training Goal PT LTG, Outcome goal ongoing  -LM goal ongoing  -LM goal ongoing  -LR    Stair Training PT LTG    Stair Training Goal PT LTG, Date Established   07/10/17  -LR    Stair Training Goal PT LTG, Time to Achieve   5 days  -LR    Stair Training Goal PT LTG, Number of Steps   2  -LR    Stair Training Goal PT LTG, [place] Level   contact guard assist  -LR    Stair Training Goal PT LTG, Assist Device   1 handrail;cane, straight;other (see comments)   or HHA  -LR    Stair Training Goal PT LTG, Date Goal Reviewed 07/11/17  -LM 07/11/17  -LM 07/10/17  -LR    Stair Training Goal PT LTG, Outcome goal ongoing  -LM goal ongoing  -LM goal ongoing  -LR    Range of Motion PT LTG    Range of Motion Goal PT LTG, Date Established   07/10/17  -LR    Range of Motion Goal PT LTG, Time to Achieve   5 days  -LR    Range fo Motion Goal PT LTG, Joint   R knee  -LR    Range of Motion Goal PT LTG, AAROM Measure   0-90 degrees  -LR    Range of Motion Goal PT LTG, Date Goal Reviewed 07/11/17  -LM 07/11/17  -LM 07/10/17  -LR    Range of Motion Goal PT LTG,  Outcome goal ongoing  -LM goal ongoing  -LM goal ongoing  -LR      User Key  (r) = Recorded By, (t) = Taken By, (c) = Cosigned By    Initials Name Provider Type    LR Cindy Mcgovern, PT Physical Therapist    DAV Justice, PT Physical Therapist          Physical Therapy Education     Title: PT OT SLP Therapies (Done)     Topic: Physical Therapy (Done)     Point: Mobility training (Done)    Learning Progress Summary    Learner Readiness Method Response Comment Documented by Status   Patient Acceptance E,D VU,NR Reviewed benefits of activity, correct gait mechanics, WB status, HEP.  07/11/17 1500 Done    Acceptance E,D VU,NR Reviewed benefits of activity, correct gait mechanics, WB status, expectations for therapy, ambulating in [place] with family x2 and gait belt, HEP.  07/11/17 1114 Done    Acceptance E,D VU,NR Educated on correct gait mechanics, weight bearing status.  07/10/17 1634 Done   Family Acceptance E,D VU,NR Reviewed benefits of activity, correct gait mechanics, WB status, HEP.  07/11/17 1500 Done    Acceptance E,D VU,NR Reviewed benefits of activity, correct gait mechanics, WB status, expectations for therapy, ambulating in [place] with family x2 and gait belt, HEP.  07/11/17 1114 Done    Acceptance E,D VU,NR Educated on correct gait mechanics, weight bearing status.  07/10/17 1634 Done               Point: Home exercise program (Done)    Learning Progress Summary    Learner Readiness Method Response Comment Documented by Status   Patient Acceptance E,D VU,NR Reviewed benefits of activity, correct gait mechanics, WB status, HEP.  07/11/17 1500 Done    Acceptance E,D VU,NR Reviewed benefits of activity, correct gait mechanics, WB status, expectations for therapy, ambulating in [place] with family x2 and gait belt, HEP.  07/11/17 1114 Done    Acceptance E,D VU,NR Educated on correct gait mechanics, weight bearing status.  07/10/17 1634 Done   Family Acceptance E,D VU,NR Reviewed  benefits of activity, correct gait mechanics, WB status, HEP.  07/11/17 1500 Done    Acceptance E,D VU,NR Reviewed benefits of activity, correct gait mechanics, WB status, expectations for therapy, ambulating in [place] with family x2 and gait belt, HEP. LM 07/11/17 1114 Done    Acceptance E,D VU,NR Educated on correct gait mechanics, weight bearing status. LR 07/10/17 1634 Done               Point: Body mechanics (Done)    Learning Progress Summary    Learner Readiness Method Response Comment Documented by Status   Patient Acceptance E,D VU,NR Reviewed benefits of activity, correct gait mechanics, WB status, HEP. LM 07/11/17 1500 Done    Acceptance E,D VU,NR Reviewed benefits of activity, correct gait mechanics, WB status, expectations for therapy, ambulating in [place] with family x2 and gait belt, HEP.  07/11/17 1114 Done    Acceptance E,D VU,NR Educated on correct gait mechanics, weight bearing status. LR 07/10/17 1634 Done   Family Acceptance E,D VU,NR Reviewed benefits of activity, correct gait mechanics, WB status, HEP. LM 07/11/17 1500 Done    Acceptance E,D VU,NR Reviewed benefits of activity, correct gait mechanics, WB status, expectations for therapy, ambulating in [place] with family x2 and gait belt, HEP.  07/11/17 1114 Done    Acceptance E,D VU,NR Educated on correct gait mechanics, weight bearing status. LR 07/10/17 1634 Done               Point: Precautions (Done)    Learning Progress Summary    Learner Readiness Method Response Comment Documented by Status   Patient Acceptance E,D VU,NR Reviewed benefits of activity, correct gait mechanics, WB status, HEP.  07/11/17 1500 Done    Acceptance E,D VU,NR Reviewed benefits of activity, correct gait mechanics, WB status, expectations for therapy, ambulating in [place] with family x2 and gait belt, HEP.  07/11/17 1114 Done    Acceptance E,D VU,NR Educated on correct gait mechanics, weight bearing status. LR 07/10/17 1634 Done   Family Acceptance E,D VU,NR  Reviewed benefits of activity, correct gait mechanics, WB status, HEP. LM 07/11/17 1500 Done    Acceptance E,D MARIA DE JESUS ROLLE Reviewed benefits of activity, correct gait mechanics, WB status, expectations for therapy, ambulating in [place] with family x2 and gait belt, HEP. LM 07/11/17 1114 Done    Acceptance E,D SARIAH,MARIA DE JESUS Educated on correct gait mechanics, weight bearing status. LR 07/10/17 1634 Done                      User Key     Initials Effective Dates Name Provider Type Discipline    LR 06/19/15 -  Cindy Mcgovern, PT Physical Therapist PT     06/09/17 -  Chloé Justice, PT Physical Therapist PT                    PT Recommendation and Plan  Anticipated Equipment Needs At Discharge: tub bench  Anticipated Discharge Disposition: home with assist, home with home health  Planned Therapy Interventions: balance training, bed mobility training, gait training, home exercise program, patient/family education, ROM (Range of Motion), stair training, strengthening, transfer training  PT Frequency: 2 times/day  Plan of Care Review  Plan Of Care Reviewed With: patient, family  Progress: progress toward functional goals as expected  Outcome Summary/Follow up Plan: Pt increased ambulation distance to 150 feet with CGA and RW, limited by fatigue. Pt's ROM measured 16-72 degrees. Will initate stair training next visit.          Outcome Measures       07/11/17 1416 07/11/17 1040 07/11/17 0708    How much help from another person do you currently need...    Turning from your back to your side while in flat bed without using bedrails? 3  -LM 3  -LM     Moving from lying on back to sitting on the side of a flat bed without bedrails? 3  -LM 3  -LM     Moving to and from a bed to a chair (including a wheelchair)? 3  -LM 3  -LM     Standing up from a chair using your arms (e.g., wheelchair, bedside chair)? 3  -LM 3  -LM     Climbing 3-5 steps with a railing? 2  -LM 2  -LM     To walk in hospital room? 3  -LM 3  -LM     AM-PAC 6 Clicks  Score 17  -LM 17  -LM     How much help from another is currently needed...    Putting on and taking off regular lower body clothing?   3  -ST    Bathing (including washing, rinsing, and drying)   3  -ST    Toileting (which includes using toilet bed pan or urinal)   3  -ST    Putting on and taking off regular upper body clothing   4  -ST    Taking care of personal grooming (such as brushing teeth)   4  -ST    Eating meals   4  -ST    Score   21  -ST    Functional Assessment    Outcome Measure Options AM-PAC 6 Clicks Basic Mobility (PT)  -LM AM-PAC 6 Clicks Basic Mobility (PT)  -LM AM-PAC 6 Clicks Daily Activity (OT)  -ST      07/10/17 1545          How much help from another person do you currently need...    Turning from your back to your side while in flat bed without using bedrails? 3  -LR      Moving from lying on back to sitting on the side of a flat bed without bedrails? 3  -LR      Moving to and from a bed to a chair (including a wheelchair)? 3  -LR      Standing up from a chair using your arms (e.g., wheelchair, bedside chair)? 2  -LR      Climbing 3-5 steps with a railing? 2  -LR      To walk in hospital room? 3  -LR      AM-PAC 6 Clicks Score 16  -LR      Functional Assessment    Outcome Measure Options AM-PAC 6 Clicks Basic Mobility (PT)  -LR        User Key  (r) = Recorded By, (t) = Taken By, (c) = Cosigned By    Initials Name Provider Type     Marleni Yeager, OTR Occupational Therapist    LR Cindy Mcgovern, PT Physical Therapist    LM Chloé Justice, PT Physical Therapist           Time Calculation:         PT Charges       07/11/17 1503 07/11/17 1117       Time Calculation    Start Time 1416  -LM 1040  -LM     PT Received On 07/11/17  -LM 07/11/17  -LM     PT Goal Re-Cert Due Date 07/20/17  -LM 07/20/17  -LM     Time Calculation- PT    Total Timed Code Minutes- PT 30 minute(s)  -LM 23 minute(s)  -LM       User Key  (r) = Recorded By, (t) = Taken By, (c) = Cosigned By    Initials Name  Provider Type    LM Chloé Justice PT Physical Therapist          Therapy Charges for Today     Code Description Service Date Service Provider Modifiers Qty    34722250708 HC GAIT TRAINING EA 15 MIN 7/11/2017 Chloé Justice, PT GP 1    26526148795 HC PT THER PROC EA 15 MIN 7/11/2017 Chloé Justice, PT GP 1    45878497608 HC GAIT TRAINING EA 15 MIN 7/11/2017 Chloé Justice, PT GP 1    95438945560 HC PT THER PROC EA 15 MIN 7/11/2017 Chloé Justice, PT GP 1          PT G-Codes  Outcome Measure Options: AM-PAC 6 Clicks Basic Mobility (PT)    Chloé Justice PT  7/11/2017

## 2017-07-11 NOTE — PLAN OF CARE
Problem: Patient Care Overview (Adult)  Goal: Plan of Care Review  Outcome: Ongoing (interventions implemented as appropriate)    07/10/17 1856   Coping/Psychosocial Response Interventions   Plan Of Care Reviewed With patient;daughter;family         Problem: Knee Replacement, Total (Adult)  Goal: Signs and Symptoms of Listed Potential Problems Will be Absent or Manageable (Knee Replacement, Total)  Outcome: Ongoing (interventions implemented as appropriate)    07/10/17 1856   Knee Replacement, Total   Problems Assessed (Total Knee Replacement) pain;constipation   Problems Present (Total Knee Replacement) pain;constipation

## 2017-07-11 NOTE — PLAN OF CARE
Problem: Patient Care Overview (Adult)  Goal: Plan of Care Review  Outcome: Ongoing (interventions implemented as appropriate)    07/11/17 1115   Coping/Psychosocial Response Interventions   Plan Of Care Reviewed With patient;family   Patient Care Overview   Progress progress toward functional goals as expected   Outcome Evaluation   Outcome Summary/Follow up Plan Pt ambulated 92 feet with CGA and RW, limited by fatigue. Will measure ROM in PM. Will progress with mobility as able.          Problem: Inpatient Physical Therapy  Goal: Bed Mobility Goal LTG- PT  Outcome: Ongoing (interventions implemented as appropriate)    07/10/17 1545 07/11/17 1115   Bed Mobility PT LTG   Bed Mobility PT LTG, Date Established 07/10/17 --    Bed Mobility PT LTG, Time to Achieve 5 days --    Bed Mobility PT LTG, Activity Type supine to sit/sit to supine --    Bed Mobility PT LTG, [place] Level conditional independence --    Bed Mobility PT LTG, Date Goal Reviewed --  07/11/17   Bed Mobility PT LTG, Outcome --  goal ongoing       Goal: Transfer Training Goal 1 LTG- PT  Outcome: Ongoing (interventions implemented as appropriate)    07/10/17 1545 07/11/17 1115   Transfer Training PT LTG   Transfer Training PT LTG, Date Established 07/10/17 --    Transfer Training PT LTG, Time to Achieve 5 days --    Transfer Training PT LTG, Activity Type sit to stand/stand to sit --    Transfer Training PT LTG, [place] Level conditional independence --    Transfer Training PT LTG, Assist Device walker, rolling --    Transfer Training PT LTG, Date Goal Reviewed --  07/11/17   Transfer Training PT LTG, Outcome --  goal ongoing       Goal: Gait Training Goal LTG- PT  Outcome: Ongoing (interventions implemented as appropriate)    07/10/17 1545 07/11/17 1115   Gait Training PT LTG   Gait Training Goal PT LTG, Date Established 07/10/17 --    Gait Training Goal PT LTG, Time to Achieve 5 days --    Gait Training Goal PT LTG, [place] Level  conditional independence --    Gait Training Goal PT LTG, Assist Device walker, rolling --    Gait Training Goal PT LTG, Distance to Achieve 500 feet --    Gait Training Goal PT LTG, Date Goal Reviewed --  07/11/17   Gait Training Goal PT LTG, Outcome --  goal ongoing       Goal: Stair Training Goal LTG- PT  Outcome: Ongoing (interventions implemented as appropriate)    07/10/17 1545 07/11/17 1115   Stair Training PT LTG   Stair Training Goal PT LTG, Date Established 07/10/17 --    Stair Training Goal PT LTG, Time to Achieve 5 days --    Stair Training Goal PT LTG, Number of Steps 2 --    Stair Training Goal PT LTG, [place] Level contact guard assist --    Stair Training Goal PT LTG, Assist Device 1 handrail;cane, straight;other (see comments)  (or HHA) --    Stair Training Goal PT LTG, Date Goal Reviewed --  07/11/17   Stair Training Goal PT LTG, Outcome --  goal ongoing       Goal: Range of Motion Goal LTG- PT  Outcome: Ongoing (interventions implemented as appropriate)    07/10/17 1545 07/11/17 1115   Range of Motion PT LTG   Range of Motion Goal PT LTG, Date Established 07/10/17 --    Range of Motion Goal PT LTG, Time to Achieve 5 days --    Range fo Motion Goal PT LTG, Joint R knee --    Range of Motion Goal PT LTG, AAROM Measure 0-90 degrees --    Range of Motion Goal PT LTG, Date Goal Reviewed --  07/11/17   Range of Motion Goal PT LTG, Outcome --  goal ongoing

## 2017-07-11 NOTE — PROGRESS NOTES
"IM progress note      Kylee Robles  [id number]  1945     LOS: 1 day     Attending: Jad Pineda MD    Primary Care Provider: Devon Akers MD      Chief Complaint/Reason for visit:  No chief complaint on file.      Subjective   Doing well. Good pain control. No n/vom/sob. Ambulated to bathroom a couple of times.  Ambulated 92 feet with physical therapy with contact guard assist and rolling walker.    Objective     Vital Signs  Blood pressure 135/62, pulse 101, temperature 98.4 °F (36.9 °C), temperature source Temporal Artery , resp. rate 16, height 66\" (167.6 cm), weight 201 lb (91.2 kg), SpO2 98 %.  Temp (24hrs), Av.4 °F (36.3 °C), Min:96.2 °F (35.7 °C), Max:98.9 °F (37.2 °C)      Intake/Output:    Intake/Output Summary (Last 24 hours) at 17 1431  Last data filed at 17 0328   Gross per 24 hour   Intake              200 ml   Output              400 ml   Net             -200 ml         Physical Therapy:  Outcome Summary/Follow up Plan Pt ambulated 92 feet with CGA and RW, limited by fatigue. Will measure ROM in PM. Will progress with mobility as able.          Physical Exam:     General Appearance:    Alert, cooperative, in no acute distress   Head:    Normocephalic, without obvious abnormality, atraumatic    Lungs:     Normal effort, symmetric chest rise, no crepitus, clear to      auscultation bilaterally                  Heart:    Regular rhythm and normal rate, normal S1 and S2   Abdomen:     Normal bowel sounds, no masses, no organomegaly, soft        non-tender, non-distended, no guarding, no rebound                tenderness   Extremities:   No clubbing, cyanosis or edema.  No deformities.   Right knee with clean dry and intact Ace wrap in place.    Adductor canal block catheter present.     Pulses:   Pulses palpable and equal bilaterally   Skin:   No bleeding, bruising or rash   Neurologic:   Moves all extremities with no obvious focal motor deficit.  Cranial nerves 2 - 12 grossly " intact.  Intact flexion and dorsiflexion bilateral feet.       Results Review:     I reviewed the patient's new clinical results.     Results from last 7 days  Lab Units 07/11/17  0533 07/05/17  0955   WBC 10*3/mm3 6.44 4.50   HEMOGLOBIN g/dL 10.1* 11.5   HEMATOCRIT % 31.8* 35.9   PLATELETS 10*3/mm3 205 257       Results from last 7 days  Lab Units 07/11/17  0533 07/05/17  0955   SODIUM mmol/L 134 136   POTASSIUM mmol/L 3.6 4.4   CHLORIDE mmol/L 104 97*   CO2 mmol/L 25.0 23.0   BUN mg/dL 10 18   CREATININE mg/dL 0.70 0.80   CALCIUM mg/dL 9.1 9.4   BILIRUBIN mg/dL  --  0.4   ALK PHOS U/L  --  161*   ALT (SGPT) U/L  --  11   AST (SGOT) U/L  --  19   GLUCOSE mg/dL 121* 100     I reviewed the patient's new imaging including images and reports.    All medications reviewed.     aspirin 325 mg Oral Daily   atorvastatin 10 mg Oral Daily   docusate sodium 100 mg Oral BID   famotidine 20 mg Oral BID   losartan 100 mg Oral Daily   propranolol  mg Oral Daily       acetaminophen 650 mg Q4H PRN   acetaminophen 650 mg Q4H PRN   albuterol 2.5 mg Q6H PRN   bisacodyl 10 mg Daily PRN   bisacodyl 10 mg Daily PRN   hydrALAZINE 10 mg Q6H PRN   HYDROcodone-acetaminophen 1 tablet Q4H PRN   HYDROmorphone 0.5 mg Q2H PRN   And     naloxone 0.1 mg Q5 Min PRN   ipratropium-albuterol 3 mL Q6H PRN   magnesium hydroxide 10 mL Daily PRN   ondansetron 4 mg Q6H PRN   ondansetron 4 mg Q6H PRN   prochlorperazine 5 mg Q6H PRN   Or     prochlorperazine 5 mg Q6H PRN   Or     prochlorperazine 25 mg Q12H PRN   sodium chloride 500 mL TID PRN   sodium chloride 1-10 mL PRN       Assessment/Plan     Principal Problem:    Status post total right knee replacement  Active Problems:    Arthritis of knee, right    GERD (gastroesophageal reflux disease)    Hyperlipidemia    HTN (hypertension)    COPD (chronic obstructive pulmonary disease)    Mild asymptomatic acute blood loss anemia.       Plan  1. PT/OT.  Weightbearing as tolerated right lower extremity.  2.  Pain control-prns along with adductor canal catheter infusion of ropivacaine.  3. IS-encouraged  4. DVT proph-mechanicals and daily aspirin.  5. Bowel regimen  6. Monitor post-op labs  Blood pressure control is good  7. DC planning.  Home with home health PT, likely tomorrow.    Saad Oneal MD  07/11/17  2:31 PM

## 2017-07-11 NOTE — PROGRESS NOTES
"Kylee Robles  [id number]  1945    /62 (BP Location: Left arm, Patient Position: Sitting)  Pulse 101  Temp 98.4 °F (36.9 °C) (Temporal Artery )   Resp 16  Ht 66\" (167.6 cm)  Wt 201 lb (91.2 kg)  SpO2 98%  BMI 32.44 kg/m2    Lab Results (last 24 hours)     Procedure Component Value Units Date/Time    CBC & Differential [[id number]] Collected:  07/11/17 0533    Specimen:  Blood Updated:  07/11/17 0719    Narrative:       The following orders were created for panel order CBC & Differential.  Procedure                               Abnormality         Status                     ---------                               -----------         ------                     CBC Auto Differential[[id number]]        Abnormal            Final result                 Please view results for these tests on the individual orders.    CBC Auto Differential [[id number]]  (Abnormal) Collected:  07/11/17 0533    Specimen:  Blood Updated:  07/11/17 0719     WBC 6.44 10*3/mm3      RBC 3.68 (L) 10*6/mm3      Hemoglobin 10.1 (L) g/dL      Hematocrit 31.8 (L) %      MCV 86.4 fL      MCH 27.4 pg      MCHC 31.8 (L) g/dL      RDW 13.6 %      RDW-SD 43.3 fl      MPV 10.0 fL      Platelets 205 10*3/mm3      Neutrophil % 68.4 %      Lymphocyte % 13.7 (L) %      Monocyte % 17.5 (H) %      Eosinophil % 0.2 %      Basophil % 0.0 %      Immature Grans % 0.2 %      Neutrophils, Absolute 4.41 10*3/mm3      Lymphocytes, Absolute 0.88 10*3/mm3      Monocytes, Absolute 1.13 (H) 10*3/mm3      Eosinophils, Absolute 0.01 10*3/mm3      Basophils, Absolute 0.00 10*3/mm3      Immature Grans, Absolute 0.01 10*3/mm3     Basic Metabolic Panel [[id number]]  (Abnormal) Collected:  07/11/17 0533    Specimen:  Blood Updated:  07/11/17 0737     Glucose 121 (H) mg/dL      BUN 10 mg/dL      Creatinine 0.70 mg/dL      Sodium 134 mmol/L      Potassium 3.6 mmol/L      Chloride 104 mmol/L      CO2 25.0 mmol/L      Calcium 9.1 mg/dL      eGFR Non African Amer 82 " mL/min/1.73      BUN/Creatinine Ratio 14.3     Anion Gap 5.0 mmol/L     Narrative:       [employer] Guidelines    Stage     Description        GFR  1         Normal or High     90+  2         Mild decrease      60-89  3         Moderate decrease  30-59  4         Severe decrease    15-29  5         Kidney failure     <15          Patient Care Team:  Devon Akers MD as PCP - General (Family Medicine)    SUBJECTIVE  Pain well controlled.  Good start in physical therapy.    PHYSICAL EXAM  Incision benign  Minimal thigh swelling  Neurovascularly intact to knees and toes    Principal Problem:    Status post total right knee replacement  Active Problems:    Arthritis of knee, right    GERD (gastroesophageal reflux disease)    Hyperlipidemia    HTN (hypertension)    COPD (chronic obstructive pulmonary disease)      PLAN / DISPOSITION:  Hemoglobin 10.1 - no transfusion anticipated  Discharge home tomorrow if good progress with physical therapy    Jad Pineda MD  07/11/17  9:21 AM

## 2017-07-11 NOTE — PLAN OF CARE
Problem: Patient Care Overview (Adult)  Goal: Plan of Care Review  Outcome: Ongoing (interventions implemented as appropriate)    07/11/17 1500   Coping/Psychosocial Response Interventions   Plan Of Care Reviewed With patient;family   Patient Care Overview   Progress progress toward functional goals as expected   Outcome Evaluation   Outcome Summary/Follow up Plan Pt increased ambulation distance to 150 feet with CGA and RW, limited by fatigue. Pt's ROM measured 16-72 degrees. Will initate stair training next visit.         Problem: Inpatient Physical Therapy  Goal: Bed Mobility Goal LTG- PT  Outcome: Ongoing (interventions implemented as appropriate)    07/10/17 1545 07/11/17 1500   Bed Mobility PT LTG   Bed Mobility PT LTG, Date Established 07/10/17 --    Bed Mobility PT LTG, Time to Achieve 5 days --    Bed Mobility PT LTG, Activity Type supine to sit/sit to supine --    Bed Mobility PT LTG, [place] Level conditional independence --    Bed Mobility PT LTG, Date Goal Reviewed --  07/11/17   Bed Mobility PT LTG, Outcome --  goal ongoing       Goal: Transfer Training Goal 1 LTG- PT  Outcome: Ongoing (interventions implemented as appropriate)    07/10/17 1545 07/11/17 1500   Transfer Training PT LTG   Transfer Training PT LTG, Date Established 07/10/17 --    Transfer Training PT LTG, Time to Achieve 5 days --    Transfer Training PT LTG, Activity Type sit to stand/stand to sit --    Transfer Training PT LTG, [place] Level conditional independence --    Transfer Training PT LTG, Assist Device walker, rolling --    Transfer Training PT LTG, Date Goal Reviewed --  07/11/17   Transfer Training PT LTG, Outcome --  goal ongoing       Goal: Gait Training Goal LTG- PT  Outcome: Ongoing (interventions implemented as appropriate)    07/10/17 1545 07/11/17 1500   Gait Training PT LTG   Gait Training Goal PT LTG, Date Established 07/10/17 --    Gait Training Goal PT LTG, Time to Achieve 5 days --    Gait Training Goal  PT LTG, [place] Level conditional independence --    Gait Training Goal PT LTG, Assist Device walker, rolling --    Gait Training Goal PT LTG, Distance to Achieve 500 feet --    Gait Training Goal PT LTG, Date Goal Reviewed --  07/11/17   Gait Training Goal PT LTG, Outcome --  goal ongoing       Goal: Stair Training Goal LTG- PT  Outcome: Ongoing (interventions implemented as appropriate)    07/10/17 1545 07/11/17 1500   Stair Training PT LTG   Stair Training Goal PT LTG, Date Established 07/10/17 --    Stair Training Goal PT LTG, Time to Achieve 5 days --    Stair Training Goal PT LTG, Number of Steps 2 --    Stair Training Goal PT LTG, [place] Level contact guard assist --    Stair Training Goal PT LTG, Assist Device 1 handrail;cane, straight;other (see comments)  (or HHA) --    Stair Training Goal PT LTG, Date Goal Reviewed --  07/11/17   Stair Training Goal PT LTG, Outcome --  goal ongoing       Goal: Range of Motion Goal LTG- PT  Outcome: Ongoing (interventions implemented as appropriate)    07/10/17 1545 07/11/17 1500   Range of Motion PT LTG   Range of Motion Goal PT LTG, Date Established 07/10/17 --    Range of Motion Goal PT LTG, Time to Achieve 5 days --    Range fo Motion Goal PT LTG, Joint R knee --    Range of Motion Goal PT LTG, AAROM Measure 0-90 degrees --    Range of Motion Goal PT LTG, Date Goal Reviewed --  07/11/17   Range of Motion Goal PT LTG, Outcome --  goal ongoing

## 2017-07-11 NOTE — PROGRESS NOTES
Discharge Planning Assessment  [facility]     Patient Name: Kylee Robles  MRN: [id number]  Today's Date: 7/11/2017    Admit Date: 7/10/2017          Discharge Needs Assessment       07/11/17 1147    Living Environment    Lives With child(joe), adult    Living Arrangements house    Home Accessibility bed and bath on same level;stairs to enter home    Number of Stairs to Enter Home 2    Transportation Available car;family or friend will provide    Living Environment    Quality Of Family Relationships supportive    Able to Return to Prior Living Arrangements yes    Living Arrangement Comments Ms. Robles lives with her son and his family in a one story home in [facility].  She has several family members that can assist her at home as needed.    Discharge Needs Assessment    Readmission Within The Last 30 Days no previous admission in last 30 days    Outpatient/Agency/Support Group Needs homecare agency (specify level of care)    Community Agency Name(S) --   [facility] [phone]    Equipment Currently Used at Home cane, straight;walker, rolling;commode;raised toilet    Equipment Needed After Discharge none    Discharge Disposition home healthcare service    Discharge Contact Information if Applicable Daughter Dolores Perez, ph [phone]            Discharge Plan       07/11/17 1151    Case Management/Social Work Plan    Plan Home with [facility]    Patient/Family In Agreement With Plan yes    Additional Comments Met with Ms. Robles and her family at the bedside to discuss discharge planning.  Ms. Robles is ambulating with a rolling walker.  She has sufficient DME in the home.  Discussed home health PT and she was agreeable to [facility].  Referral given to Ann at [facility].  Discharge plan is home with family assistance and [facility].  Patient's daughter will be transporting her home by car.  CM will continue to follow.        Discharge Placement     No information found         Expected Discharge Date and Time     Expected Discharge Date Expected Discharge Time    Jul 12, 2017               Demographic Summary       07/11/17 1146    Referral Information    Admission Type inpatient    Arrived From home or self-care    Reason For Consult discharge planning    Record Reviewed clinical discipline documentation;history and physical;medical record    Contact Information    Permission Granted to Share Information With     Primary Care Physician Information    Name Devon Akers MD            Functional Status       07/11/17 1146    Functional Status Prior    Ambulation 1-->assistive equipment    Transferring 1-->assistive equipment    Toileting 0-->independent    Bathing 0-->independent    Dressing 0-->independent    Eating 0-->independent    Communication 0-->understands/communicates without difficulty    Swallowing 0-->swallows foods/liquids without difficulty    IADL    Medications independent    Meal Preparation assistive equipment    Housekeeping assistive equipment    Laundry assistive equipment    Shopping assistive equipment    Oral Care independent    Activity Tolerance    Current Activity Limitations --   See PT notes    Usual Activity Tolerance good    Cognitive/Perceptual/Developmental    Current Mental Status/Cognitive Functioning no deficits noted    Recent Changes in Mental Status/Cognitive Functioning no changes    Employment/Financial    Employment/Finance Comments Ms. Robles has prescription drug coverage [facility] Medicare and Medicaid.  Verified insurance with patient.  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            Psychosocial     None            Abuse/Neglect     None            Legal       07/11/17 1147    Legal    Legal Comments No advance directives.            Substance Abuse       07/11/17 1147    Substance Use, Patient    Substance Use never used            Patient Forms     None          Tawanna Barroso

## 2017-07-11 NOTE — PROGRESS NOTES
[facility]    Acute pain service Inpatient Progress Note    Patient Name: Kylee Robles  :  1945  MRN:  [id number]        Acute Pain  Service Inpatient Progress Note:    Pain Score:5/10  LOC: alert and awake  Resp Status: intubated  Cardiac: VS stable  Side Effects:None  Catheter Site:clean  Cath type: peripheral nerve cath(MOOG pump)  Infusion rate: 12ml/hr  Catheter Plan:Catheter to remain Insitu and Continue catheter infusion rate unchanged  Comments: She is pleased with pain control, did well with PT yesterday.

## 2017-07-11 NOTE — THERAPY TREATMENT NOTE
Acute Care - Physical Therapy Treatment Note  [facility]     Patient Name: Kylee Robles  : 1945  MRN: [id number]  Today's Date: 2017  Onset of Illness/Injury or Date of Surgery Date: 07/10/17  Date of Referral to PT: 07/10/17  Referring Physician: MD Edwin    Admit Date: 7/10/2017    Visit Dx:    ICD-10-CM ICD-9-CM   1. Impaired functional mobility, balance, gait, and endurance Z74.09 V49.89   2. Impaired mobility and ADLs Z74.09 799.89     Patient Active Problem List   Diagnosis   • Arthritis of knee, right   • Status post total right knee replacement   • GERD (gastroesophageal reflux disease)   • Hyperlipidemia   • HTN (hypertension)   • COPD (chronic obstructive pulmonary disease)               Adult Rehabilitation Note       17 1040          Rehab Assessment/Intervention    Discipline physical therapist  -LM      Document Type therapy note (daily note)  -LM      Subjective Information agree to therapy;complains of;pain  -LM      Patient Effort, Rehab Treatment good  -LM      Symptoms Noted During/After Treatment fatigue  -LM      Precautions/Limitations fall precautions;other (see comments)   R adductor canal nerve catheter  -LM      Recorded by [LM] Chloé Justice, PT      Pain Assessment    Pain Assessment 0-10  -LM      Pain Score 6  -LM      Post Pain Score 0  -LM      Pain Type Acute pain  -LM      Pain Location Knee  -LM      Pain Orientation Right;Anterior  -LM      Pain Intervention(s) Repositioned;Ambulation/increased activity  -LM      Recorded by [LM] Chloé Justice, PT      Vision Assessment/Intervention    Visual Impairment WFL  -LM      Recorded by [LM] Chloé Justice PT      Cognitive Assessment/Intervention    Current Cognitive/Communication Assessment functional  -LM      Orientation Status oriented x 4  -LM      Follows Commands/Answers Questions 100% of the time  -LM      Personal Safety WNL/WFL  -LM      Recorded by [LM] Chloé Justice, PT      Mobility  Assessment/Training    Extremity Weight-Bearing Status right lower extremity  -LM      Right Lower Extremity Weight-Bearing weight-bearing as tolerated  -LM      Recorded by [LM] Chloé Justice, PT      Bed Mobility, Assessment/Treatment    Bed Mob, Supine to Sit, [place] not tested   Pt received sitting UIC  -LM      Bed Mob, Sit to Supine, [place] not tested   Pt left sitting UIC  -LM      Recorded by [LM] Chloé Justice, PT      Transfer Assessment/Treatment    Transfers, Sit-Stand [place] minimum assist (75% patient effort);verbal cues required  -LM      Transfers, Stand-Sit [place] contact guard assist;verbal cues required  -LM      Transfers, Sit-Stand-Sit, Assist Device rolling walker  -LM      Transfer, Comment Verbal cues to push from chair armrests when standing. Pt experienced posterior lean when standing; required increased assistance to maintain upright position. Verbal cues to reach for chair armrests prior to sitting and step out RLE.  -LM      Recorded by [LM] Chloé Justice, PT      Gait Assessment/Treatment    Gait, [place] Level contact guard assist;verbal cues required  -LM      Gait, Assistive Device rolling walker  -LM      Gait, Distance (Feet) 92  -LM      Gait, Gait Pattern Analysis swing-through gait  -LM      Gait, Gait Deviations right:;antalgic;alley decreased;decreased heel strike;forward flexed posture;step length decreased;toe-to-floor clearance decreased;weight-shifting ability decreased  -LM      Gait, Safety Issues sequencing ability decreased;step length decreased;weight-shifting ability decreased  -LM      Gait, Impairments ROM decreased;strength decreased;pain  -LM      Gait, Comment Pt ambulated with step-to gait pattern and progressed to step-through gait pattern with verbal cues and practice. Verbal cues to remain within RW bounds and not pushing it too far forward. Pt with forward flexed posture, repetitive cues for upright posture. Pt expressed  decrease in pain and stiffness in R knee with ambulation. Gait limited by fatigue.  -LM      Recorded by [LM] Chloé Justice, PT      Therapy Exercises    Exercise Protocols total knee  -LM      Total Knee Exercises right:;15 reps;completed protocol;with assist;quad set;glut set;heel slide stretch;SLR;SAQ;LAQ;ankle pumps/circles   Jacqueline with SLR  -LM      Recorded by [LM] Chloé Justice, PT      Positioning and Restraints    Pre-Treatment Position sitting in chair/recliner  -LM      Post Treatment Position chair  -LM      In Chair notified nsg;reclined;sitting;call light within reach;encouraged to call for assist;with family/caregiver   SCDs  -LM      Recorded by [LM] Chloé Justice, PT        User Key  (r) = Recorded By, (t) = Taken By, (c) = Cosigned By    Initials Name Effective Dates    LM Chloé Justice, PT 06/09/17 -                 IP PT Goals       07/11/17 1115 07/10/17 1545       Bed Mobility PT LTG    Bed Mobility PT LTG, Date Established  07/10/17  -LR     Bed Mobility PT LTG, Time to Achieve  5 days  -LR     Bed Mobility PT LTG, Activity Type  supine to sit/sit to supine  -LR     Bed Mobility PT LTG, [place] Level  conditional independence  -LR     Bed Mobility PT LTG, Date Goal Reviewed 07/11/17  -LM 07/10/17  -LR     Bed Mobility PT LTG, Outcome goal ongoing  -LM goal ongoing  -LR     Transfer Training PT LTG    Transfer Training PT LTG, Date Established  07/10/17  -LR     Transfer Training PT LTG, Time to Achieve  5 days  -LR     Transfer Training PT LTG, Activity Type  sit to stand/stand to sit  -LR     Transfer Training PT LTG, [place] Level  conditional independence  -LR     Transfer Training PT LTG, Assist Device  walker, rolling  -LR     Transfer Training PT  LTG, Date Goal Reviewed 07/11/17  -LM 07/10/17  -LR     Transfer Training PT LTG, Outcome goal ongoing  -LM goal ongoing  -LR     Gait Training PT LTG    Gait Training Goal PT LTG, Date Established  07/10/17  -LR     Gait  Training Goal PT LTG, Time to Achieve  5 days  -LR     Gait Training Goal PT LTG, [place] Level  conditional independence  -LR     Gait Training Goal PT LTG, Assist Device  walker, rolling  -LR     Gait Training Goal PT LTG, Distance to Achieve  500 feet  -LR     Gait Training Goal PT LTG, Date Goal Reviewed 07/11/17  -LM 07/10/17  -LR     Gait Training Goal PT LTG, Outcome goal ongoing  -LM goal ongoing  -LR     Stair Training PT LTG    Stair Training Goal PT LTG, Date Established  07/10/17  -LR     Stair Training Goal PT LTG, Time to Achieve  5 days  -LR     Stair Training Goal PT LTG, Number of Steps  2  -LR     Stair Training Goal PT LTG, [place] Level  contact guard assist  -LR     Stair Training Goal PT LTG, Assist Device  1 handrail;cane, straight;other (see comments)   or HHA  -LR     Stair Training Goal PT LTG, Date Goal Reviewed 07/11/17  -LM 07/10/17  -LR     Stair Training Goal PT LTG, Outcome goal ongoing  -LM goal ongoing  -LR     Range of Motion PT LTG    Range of Motion Goal PT LTG, Date Established  07/10/17  -LR     Range of Motion Goal PT LTG, Time to Achieve  5 days  -LR     Range fo Motion Goal PT LTG, Joint  R knee  -LR     Range of Motion Goal PT LTG, AAROM Measure  0-90 degrees  -LR     Range of Motion Goal PT LTG, Date Goal Reviewed 07/11/17  -LM 07/10/17  -LR     Range of Motion Goal PT LTG, Outcome goal ongoing  -LM goal ongoing  -LR       User Key  (r) = Recorded By, (t) = Taken By, (c) = Cosigned By    Initials Name Provider Type    LR Cindy Mcgovern, PT Physical Therapist    LM Chloé Justice, PT Physical Therapist          Physical Therapy Education     Title: PT OT SLP Therapies (Done)     Topic: Physical Therapy (Done)     Point: Mobility training (Done)    Learning Progress Summary    Learner Readiness Method Response Comment Documented by Status   Patient Acceptance E,D VU,NR Reviewed benefits of activity, correct gait mechanics, WB status, expectations for  therapy, ambulating in [place] with family x2 and gait belt, HEP.  07/11/17 1114 Done    Acceptance E,D VU,NR Educated on correct gait mechanics, weight bearing status. LR 07/10/17 1634 Done   Family Acceptance E,D VU,NR Reviewed benefits of activity, correct gait mechanics, WB status, expectations for therapy, ambulating in [place] with family x2 and gait belt, HEP.  07/11/17 1114 Done    Acceptance E,D VU,NR Educated on correct gait mechanics, weight bearing status. LR 07/10/17 1634 Done               Point: Home exercise program (Done)    Learning Progress Summary    Learner Readiness Method Response Comment Documented by Status   Patient Acceptance E,D VU,NR Reviewed benefits of activity, correct gait mechanics, WB status, expectations for therapy, ambulating in [place] with family x2 and gait belt, HEP.  07/11/17 1114 Done    Acceptance E,D VU,NR Educated on correct gait mechanics, weight bearing status. LR 07/10/17 1634 Done   Family Acceptance E,D VU,NR Reviewed benefits of activity, correct gait mechanics, WB status, expectations for therapy, ambulating in [place] with family x2 and gait belt, HEP.  07/11/17 1114 Done    Acceptance E,D VU,NR Educated on correct gait mechanics, weight bearing status.  07/10/17 1634 Done               Point: Body mechanics (Done)    Learning Progress Summary    Learner Readiness Method Response Comment Documented by Status   Patient Acceptance E,D VU,NR Reviewed benefits of activity, correct gait mechanics, WB status, expectations for therapy, ambulating in [place] with family x2 and gait belt, HEP.  07/11/17 1114 Done    Acceptance E,D VU,NR Educated on correct gait mechanics, weight bearing status.  07/10/17 1634 Done   Family Acceptance E,D VU,NR Reviewed benefits of activity, correct gait mechanics, WB status, expectations for therapy, ambulating in [place] with family x2 and gait belt, HEP.  07/11/17 1114 Done    Acceptance E,D VU,NR Educated on correct gait mechanics,  weight bearing status.  07/10/17 1634 Done               Point: Precautions (Done)    Learning Progress Summary    Learner Readiness Method Response Comment Documented by Status   Patient Acceptance E,D SARIAH,NR Reviewed benefits of activity, correct gait mechanics, WB status, expectations for therapy, ambulating in [place] with family x2 and gait belt, HEP.  07/11/17 1114 Done    Acceptance E,D SARIAH,NR Educated on correct gait mechanics, weight bearing status. LR 07/10/17 1634 Done   Family Acceptance CHRISTIAN RODAS,NR Reviewed benefits of activity, correct gait mechanics, WB status, expectations for therapy, ambulating in [place] with family x2 and gait belt, HEP.  07/11/17 1114 Done    Acceptance E,D SARIAH,NR Educated on correct gait mechanics, weight bearing status.  07/10/17 1634 Done                      User Key     Initials Effective Dates Name Provider Type Discipline     06/19/15 -  Cindy Mcgovern, PT Physical Therapist PT     06/09/17 -  Chloé Justice, PT Physical Therapist PT                    PT Recommendation and Plan  Anticipated Equipment Needs At Discharge: tub bench  Anticipated Discharge Disposition: home with assist, home with home health  Planned Therapy Interventions: balance training, bed mobility training, gait training, home exercise program, patient/family education, ROM (Range of Motion), stair training, strengthening, transfer training  PT Frequency: 2 times/day  Plan of Care Review  Plan Of Care Reviewed With: patient, family  Progress: progress toward functional goals as expected  Outcome Summary/Follow up Plan: Pt ambulated 92 feet with CGA and RW, limited by fatigue. Will measure ROM in PM. Will progress with mobility as able.           Outcome Measures       07/11/17 1040 07/10/17 1545       How much help from another person do you currently need...    Turning from your back to your side while in flat bed without using bedrails? 3  -LM 3  -LR     Moving from lying on back to sitting  on the side of a flat bed without bedrails? 3  -LM 3  -LR     Moving to and from a bed to a chair (including a wheelchair)? 3  -LM 3  -LR     Standing up from a chair using your arms (e.g., wheelchair, bedside chair)? 3  -LM 2  -LR     Climbing 3-5 steps with a railing? 2  -LM 2  -LR     To walk in hospital room? 3  -LM 3  -LR     AM-PAC 6 Clicks Score 17  -LM 16  -LR     Functional Assessment    Outcome Measure Options AM-PAC 6 Clicks Basic Mobility (PT)  -LM AM-PAC 6 Clicks Basic Mobility (PT)  -LR       User Key  (r) = Recorded By, (t) = Taken By, (c) = Cosigned By    Initials Name Provider Type    LR Cindy Mcgovern, PT Physical Therapist    LM Chloé Justice PT Physical Therapist           Time Calculation:         PT Charges       07/11/17 1117          Time Calculation    Start Time 1040  -LM      PT Received On 07/11/17  -LM      PT Goal Re-Cert Due Date 07/20/17  -LM      Time Calculation- PT    Total Timed Code Minutes- PT 23 minute(s)  -LM        User Key  (r) = Recorded By, (t) = Taken By, (c) = Cosigned By    Initials Name Provider Type    DAV Justice PT Physical Therapist          Therapy Charges for Today     Code Description Service Date Service Provider Modifiers Qty    15659247478 HC GAIT TRAINING EA 15 MIN 7/11/2017 Chloé Justice, PT GP 1    77379610484 HC PT THER PROC EA 15 MIN 7/11/2017 Chloé Justice, PT GP 1          PT G-Codes  Outcome Measure Options: AM-PAC 6 Clicks Basic Mobility (PT)    Chloé Justice PT  7/11/2017

## 2017-07-11 NOTE — PLAN OF CARE
Problem: Patient Care Overview (Adult)  Goal: Plan of Care Review  Outcome: Ongoing (interventions implemented as appropriate)    07/11/17 0708   Coping/Psychosocial Response Interventions   Plan Of Care Reviewed With patient   Outcome Evaluation   Outcome Summary/Follow up Plan Pt presents with good pain control and understanding of safety s/p TKA; deficits however in transfers and ADL tasks d/t limitations in knee ROM and fatigue. will progress with independence and safety in preparation for d/c home.          Problem: Knee Replacement, Total (Adult)  Goal: Signs and Symptoms of Listed Potential Problems Will be Absent or Manageable (Knee Replacement, Total)  Outcome: Ongoing (interventions implemented as appropriate)    07/11/17 0708   Knee Replacement, Total   Problems Assessed (Total Knee Replacement) functional decline/self care deficit   Problems Present (Total Knee Replacement) functional decline/self care deficit         Problem: Inpatient Occupational Therapy  Goal: Bed Mobility Goal LTG- OT  Outcome: Ongoing (interventions implemented as appropriate)    07/11/17 0708   Bed Mobility OT LTG   Bed Mobility OT LTG, Time to Achieve 4 days   Bed Mobility OT LTG, Activity Type supine to sit/sit to supine   Bed Mobility OT LTG, [place] Level supervision required   Bed Mobility OT LTG, Assist Device leg ;bed rails   Bed Mobility OT LTG, Outcome goal ongoing       Goal: Transfer Training Goal 1 LTG- OT  Outcome: Ongoing (interventions implemented as appropriate)    07/11/17 0708   Transfer Training OT LTG   Transfer Training OT LTG, Time to Achieve 4 days   Transfer Training OT LTG, Activity Type tub   Transfer Training OT LTG, [place] Level contact guard assist   Transfer Training OT LTG, Assist Device walker, rolling   Transfer Training OT LTG, Outcome goal ongoing       Goal: LB Dressing Goal LTG- OT  Outcome: Ongoing (interventions implemented as appropriate)    07/11/17 0708   LB Dressing OT  LTG   LB Dressing Goal OT LTG, Time to Achieve 4 days   LB Dressing Goal OT LTG, [place] Level conditional independence   LB Dressing Goal OT LTG, Adaptive Equipment laces, elastic;reacher;shoe horn, long handled;sock-aid   LB Dressing Goal OT LTG, Additional Goal address shoes   LB Dressing Goal OT LTG, Outcome goal ongoing

## 2017-07-12 VITALS
BODY MASS INDEX: 32.3 KG/M2 | SYSTOLIC BLOOD PRESSURE: 130 MMHG | HEIGHT: 66 IN | RESPIRATION RATE: 16 BRPM | TEMPERATURE: 97.9 F | HEART RATE: 75 BPM | WEIGHT: 201 LBS | OXYGEN SATURATION: 97 % | DIASTOLIC BLOOD PRESSURE: 71 MMHG

## 2017-07-12 PROBLEM — D62 ACUTE BLOOD LOSS ANEMIA: Status: ACTIVE | Noted: 2017-07-12

## 2017-07-12 PROCEDURE — 97116 GAIT TRAINING THERAPY: CPT

## 2017-07-12 PROCEDURE — 97110 THERAPEUTIC EXERCISES: CPT

## 2017-07-12 PROCEDURE — 97530 THERAPEUTIC ACTIVITIES: CPT

## 2017-07-12 PROCEDURE — 25010000002 ROPIVACAINE PER 1 MG: Performed by: NURSE ANESTHETIST, CERTIFIED REGISTERED

## 2017-07-12 RX ORDER — ROPIVACAINE HYDROCHLORIDE 2 MG/ML
12 INJECTION, SOLUTION EPIDURAL; INFILTRATION CONTINUOUS
Status: DISCONTINUED | OUTPATIENT
Start: 2017-07-12 | End: 2017-07-12 | Stop reason: HOSPADM

## 2017-07-12 RX ORDER — HYDROCODONE BITARTRATE AND ACETAMINOPHEN 5; 325 MG/1; MG/1
1 TABLET ORAL EVERY 4 HOURS PRN
Qty: 40 TABLET | Refills: 0 | Status: SHIPPED | OUTPATIENT
Start: 2017-07-12 | End: 2017-07-20

## 2017-07-12 RX ORDER — PSEUDOEPHEDRINE HCL 30 MG
100 TABLET ORAL 2 TIMES DAILY
Qty: 60 CAPSULE | Refills: 0 | Status: SHIPPED | OUTPATIENT
Start: 2017-07-12

## 2017-07-12 RX ORDER — ROPIVACAINE HYDROCHLORIDE 2 MG/ML
12 INJECTION, SOLUTION EPIDURAL; INFILTRATION CONTINUOUS
Start: 2017-07-12

## 2017-07-12 RX ADMIN — ATORVASTATIN CALCIUM 10 MG: 10 TABLET, FILM COATED ORAL at 08:53

## 2017-07-12 RX ADMIN — ASPIRIN 325 MG: 325 TABLET, COATED ORAL at 08:53

## 2017-07-12 RX ADMIN — Medication 12 ML/HR: at 11:32

## 2017-07-12 RX ADMIN — FAMOTIDINE 20 MG: 20 TABLET ORAL at 08:54

## 2017-07-12 RX ADMIN — DOCUSATE SODIUM 100 MG: 100 CAPSULE, LIQUID FILLED ORAL at 08:53

## 2017-07-12 RX ADMIN — LOSARTAN POTASSIUM 100 MG: 50 TABLET, FILM COATED ORAL at 08:53

## 2017-07-12 RX ADMIN — PROPRANOLOL HYDROCHLORIDE 120 MG: 60 CAPSULE, EXTENDED RELEASE ORAL at 08:53

## 2017-07-12 RX ADMIN — HYDROCODONE BITARTRATE AND ACETAMINOPHEN 1 TABLET: 5; 325 TABLET ORAL at 08:57

## 2017-07-12 NOTE — PROGRESS NOTES
"Kylee Robles  [id number]  1945    /71 (BP Location: Left arm, Patient Position: Sitting)  Pulse 75  Temp 97.9 °F (36.6 °C) (Tympanic)   Resp 16  Ht 66\" (167.6 cm)  Wt 201 lb (91.2 kg)  SpO2 97%  BMI 32.44 kg/m2    Lab Results (last 24 hours)     ** No results found for the last 24 hours. **          Patient Care Team:  Devon Akers MD as PCP - General (Family Medicine)    SUBJECTIVE  Pain well controlled.  Good start in physical therapy.    PHYSICAL EXAM  Incision benign  Minimal thigh swelling  Neurovascularly intact to knees and toes    Principal Problem:    Status post total right knee replacement  Active Problems:    Arthritis of knee, right    GERD (gastroesophageal reflux disease)    Hyperlipidemia    HTN (hypertension)    COPD (chronic obstructive pulmonary disease)      PLAN / DISPOSITION:  Good progress with PT and pain control  Discharge home today    Jad Pineda MD  07/12/17  8:43 AM  "

## 2017-07-12 NOTE — DISCHARGE INSTRUCTIONS
YOU WILL HAVE AN ON-Q PUMP AT 12 ML/HR FOR HOME USE.  IF YOU SHOULD HAVE ANY QUESTIONS ABOUT YOUR PUMP OR NERVE CATHETER, PLEASE CALL ANESTHESIA AT THE NUMBER LISTED ON THE BLUE WRIST BAND.    YOU MAY USE ICE AS NEEDED FOR PAIN/SWELLING

## 2017-07-12 NOTE — THERAPY DISCHARGE NOTE
Acute Care - Physical Therapy Treatment Note/Discharge  [facility]     Patient Name: Kylee Robles  : 1945  MRN: [id number]  Today's Date: 2017  Onset of Illness/Injury or Date of Surgery Date: 07/10/17  Date of Referral to PT: 07/10/17  Referring Physician: MD Edwin    Admit Date: 7/10/2017    Visit Dx:    ICD-10-CM ICD-9-CM   1. Impaired functional mobility, balance, gait, and endurance Z74.09 V49.89   2. Impaired mobility and ADLs Z74.09 799.89     Patient Active Problem List   Diagnosis   • Arthritis of knee, right   • Status post total right knee replacement   • GERD (gastroesophageal reflux disease)   • Hyperlipidemia   • HTN (hypertension)   • COPD (chronic obstructive pulmonary disease)       Physical Therapy Education     Title: PT OT SLP Therapies (Done)     Topic: Physical Therapy (Done)     Point: Mobility training (Done)    Learning Progress Summary    Learner Readiness Method Response Comment Documented by Status   Patient Acceptance E,D,H MARIBEL ROLLE Reviewed HEP, benefits of activity, car transfer.  17 1314 Done    Acceptance E,D VU,NR Reviewed benefits of activity, correct gait mechanics, WB status, HEP.  17 1500 Done    Acceptance E,D VU,NR Reviewed benefits of activity, correct gait mechanics, WB status, expectations for therapy, ambulating in [place] with family x2 and gait belt, HEP.  17 1114 Done    Acceptance E,D VU,NR Educated on correct gait mechanics, weight bearing status.  07/10/17 1634 Done   Family Acceptance E,D,H MARIBEL ROLLE Reviewed HEP, benefits of activity, car transfer.  17 1314 Done    Acceptance E,D VU,NR Reviewed benefits of activity, correct gait mechanics, WB status, HEP.  17 1500 Done    Acceptance E,D VU,NR Reviewed benefits of activity, correct gait mechanics, WB status, expectations for therapy, ambulating in [place] with family x2 and gait belt, HEP.  17 1114 Done    Acceptance E,D VU,NR Educated on correct gait mechanics,  weight bearing status.  07/10/17 1634 Done               Point: Home exercise program (Done)    Learning Progress Summary    Learner Readiness Method Response Comment Documented by Status   Patient Acceptance E,D,H MARIBEL ROLLE Reviewed HEP, benefits of activity, car transfer.  07/12/17 1314 Done    Acceptance E,D VU,NR Reviewed benefits of activity, correct gait mechanics, WB status, HEP.  07/11/17 1500 Done    Acceptance E,D VU,NR Reviewed benefits of activity, correct gait mechanics, WB status, expectations for therapy, ambulating in [place] with family x2 and gait belt, HEP.  07/11/17 1114 Done    Acceptance E,D VU,NR Educated on correct gait mechanics, weight bearing status.  07/10/17 1634 Done   Family Acceptance E,D,H MARIBEL ROLLE Reviewed HEP, benefits of activity, car transfer.  07/12/17 1314 Done    Acceptance E,D VU,NR Reviewed benefits of activity, correct gait mechanics, WB status, HEP.  07/11/17 1500 Done    Acceptance E,D VU,NR Reviewed benefits of activity, correct gait mechanics, WB status, expectations for therapy, ambulating in [place] with family x2 and gait belt, HEP.  07/11/17 1114 Done    Acceptance E,D VU,NR Educated on correct gait mechanics, weight bearing status.  07/10/17 1634 Done               Point: Body mechanics (Done)    Learning Progress Summary    Learner Readiness Method Response Comment Documented by Status   Patient Acceptance E,D,H MARIBEL ROLLE Reviewed HEP, benefits of activity, car transfer.  07/12/17 1314 Done    Acceptance E,D VU,NR Reviewed benefits of activity, correct gait mechanics, WB status, HEP.  07/11/17 1500 Done    Acceptance E,D VU,NR Reviewed benefits of activity, correct gait mechanics, WB status, expectations for therapy, ambulating in [place] with family x2 and gait belt, HEP.  07/11/17 1114 Done    Acceptance E,D VU,NR Educated on correct gait mechanics, weight bearing status.  07/10/17 1634 Done   Family Acceptance E,D,H MARIBEL ROLLE Reviewed HEP, benefits of  activity, car transfer.  07/12/17 1314 Done    Acceptance E,D VU,NR Reviewed benefits of activity, correct gait mechanics, WB status, HEP.  07/11/17 1500 Done    Acceptance E,D VU,NR Reviewed benefits of activity, correct gait mechanics, WB status, expectations for therapy, ambulating in [place] with family x2 and gait belt, HEP.  07/11/17 1114 Done    Acceptance E,D VU,NR Educated on correct gait mechanics, weight bearing status.  07/10/17 1634 Done               Point: Precautions (Done)    Learning Progress Summary    Learner Readiness Method Response Comment Documented by Status   Patient Acceptance E,D,H VU,DU Reviewed HEP, benefits of activity, car transfer.  07/12/17 1314 Done    Acceptance E,D VU,NR Reviewed benefits of activity, correct gait mechanics, WB status, HEP.  07/11/17 1500 Done    Acceptance E,D VU,NR Reviewed benefits of activity, correct gait mechanics, WB status, expectations for therapy, ambulating in [place] with family x2 and gait belt, HEP.  07/11/17 1114 Done    Acceptance E,D VU,NR Educated on correct gait mechanics, weight bearing status.  07/10/17 1634 Done   Family Acceptance E,D,H MARIBEL ROLLE Reviewed HEP, benefits of activity, car transfer.  07/12/17 1314 Done    Acceptance E,D VU,NR Reviewed benefits of activity, correct gait mechanics, WB status, HEP.  07/11/17 1500 Done    Acceptance E,D VU,NR Reviewed benefits of activity, correct gait mechanics, WB status, expectations for therapy, ambulating in [place] with family x2 and gait belt, HEP.  07/11/17 1114 Done    Acceptance E,D VU,NR Educated on correct gait mechanics, weight bearing status.  07/10/17 1634 Done                      User Key     Initials Effective Dates Name Provider Type Discipline     06/19/15 -  Cindy Mcgovern, PT Physical Therapist PT     06/09/17 -  Chloé Justice, PT Physical Therapist PT                    IP PT Goals       07/12/17 1314 07/11/17 1500 07/11/17 1115    Bed Mobility PT LTG     Bed Mobility PT LTG, Date Goal Reviewed 07/12/17  -LM 07/11/17  -LM 07/11/17  -LM    Bed Mobility PT LTG, Outcome goal not met  - goal ongoing  - goal ongoing  -    Bed Mobility PT LTG, Reason Goal Not Met discharged from [facility]      Transfer Training PT LTG    Transfer Training PT  LTG, Date Goal Reviewed 07/12/17  -LM 07/11/17  -LM 07/11/17  -    Transfer Training PT LTG, Outcome goal not met  - goal ongoing  - goal ongoing  -    Transfer Training PT LTG, Reason Goal Not Met discharged from [facility]      Gait Training PT LTG    Gait Training Goal PT LTG, Date Goal Reviewed 07/12/17  -LM 07/11/17  -LM 07/11/17  -    Gait Training Goal PT LTG, Outcome goal not met  - goal ongoing  - goal ongoing  -    Gait Training Goal PT LTG, Reason Goal Not Met discharged from [facility]      Stair Training PT LTG    Stair Training Goal PT LTG, Date Goal Reviewed 07/12/17  -LM 07/11/17  -LM 07/11/17  -    Stair Training Goal PT LTG, Outcome goal not met  - goal ongoing  - goal ongoing  -    Stair Training Goal PT LTG, Reason Goal Not Met discharged from [facility]      Range of Motion PT LTG    Range of Motion Goal PT LTG, Date Goal Reviewed 07/12/17  -LM 07/11/17  -LM 07/11/17  -    Range of Motion Goal PT LTG, Outcome goal not met  - goal ongoing  - goal ongoing  -    Reason Goal Not Met (ROM) PT LTG discharged from [facility]        07/10/17 1545          Bed Mobility PT LTG    Bed Mobility PT LTG, Date Established 07/10/17  -LR      Bed Mobility PT LTG, Time to Achieve 5 days  -LR      Bed Mobility PT LTG, Activity Type supine to sit/sit to supine  -LR      Bed Mobility PT LTG, [place] Level conditional independence  -LR      Bed Mobility PT LTG, Date Goal Reviewed 07/10/17  -LR      Bed Mobility PT LTG, Outcome goal ongoing  -LR      Transfer Training PT LTG    Transfer Training PT LTG, Date Established 07/10/17  -LR      Transfer Training PT LTG, Time to  Achieve 5 days  -LR      Transfer Training PT LTG, Activity Type sit to stand/stand to sit  -LR      Transfer Training PT LTG, [place] Level conditional independence  -LR      Transfer Training PT LTG, Assist Device walker, rolling  -LR      Transfer Training PT  LTG, Date Goal Reviewed 07/10/17  -LR      Transfer Training PT LTG, Outcome goal ongoing  -LR      Gait Training PT LTG    Gait Training Goal PT LTG, Date Established 07/10/17  -LR      Gait Training Goal PT LTG, Time to Achieve 5 days  -LR      Gait Training Goal PT LTG, [place] Level conditional independence  -LR      Gait Training Goal PT LTG, Assist Device walker, rolling  -LR      Gait Training Goal PT LTG, Distance to Achieve 500 feet  -LR      Gait Training Goal PT LTG, Date Goal Reviewed 07/10/17  -LR      Gait Training Goal PT LTG, Outcome goal ongoing  -LR      Stair Training PT LTG    Stair Training Goal PT LTG, Date Established 07/10/17  -LR      Stair Training Goal PT LTG, Time to Achieve 5 days  -LR      Stair Training Goal PT LTG, Number of Steps 2  -LR      Stair Training Goal PT LTG, [place] Level contact guard assist  -LR      Stair Training Goal PT LTG, Assist Device 1 handrail;cane, straight;other (see comments)   or HHA  -LR      Stair Training Goal PT LTG, Date Goal Reviewed 07/10/17  -LR      Stair Training Goal PT LTG, Outcome goal ongoing  -LR      Range of Motion PT LTG    Range of Motion Goal PT LTG, Date Established 07/10/17  -LR      Range of Motion Goal PT LTG, Time to Achieve 5 days  -LR      Range fo Motion Goal PT LTG, Joint R knee  -LR      Range of Motion Goal PT LTG, AAROM Measure 0-90 degrees  -LR      Range of Motion Goal PT LTG, Date Goal Reviewed 07/10/17  -LR      Range of Motion Goal PT LTG, Outcome goal ongoing  -LR        User Key  (r) = Recorded By, (t) = Taken By, (c) = Cosigned By    Initials Name Provider Type    LR Cindy Mcgovern, PT Physical Therapist    LM Chloé Justice, PT Physical  Therapist              Adult Rehabilitation Note       07/12/17 1105 07/12/17 1025 07/11/17 1416    Rehab Assessment/Intervention    Discipline physical therapist  -LM occupational therapist  -MC physical therapist  -LM    Document Type therapy note (daily note);discharge evaluation/summary  -LM therapy note (daily note);discharge summary  -MC therapy note (daily note)  -LM    Subjective Information agree to therapy;no complaints  -LM agree to therapy;no complaints  -MC agree to therapy;complains of;pain  -LM    Patient Effort, Rehab Treatment good  -LM good  -MC good  -LM    Symptoms Noted During/After Treatment none  -LM fatigue  -MC fatigue  -LM    Precautions/Limitations fall precautions;other (see comments)   R adductor canal nerve cath  -LM fall precautions;other (see comments)   Adductor canal nerve cath  -MC fall precautions;other (see comments)   R adductor canal nerve catheter  -LM    Recorded by [LM] Chloé Justice, PT [MC] Yani Garcia, OT [LM] Chloé Justice, PT    Pain Assessment    Pain Assessment No/denies pain  -LM 0-10  -MC 0-10  -LM    Pain Score  2  -MC 6  -LM    Post Pain Score  4  -MC 6  -LM    Pain Type  Acute pain;Surgical pain  -MC Acute pain  -LM    Pain Location  Knee  -MC Knee  -LM    Pain Orientation  Right  -MC Right;Anterior  -LM    Pain Intervention(s)  Repositioned  -MC Repositioned;Ambulation/increased activity;Cold applied  -LM    Recorded by [LM] Chloé Justice, PT [MC] Yani Garcia, OT [LM] Chloé Justice, PT    Vision Assessment/Intervention    Visual Impairment   WFL  -LM    Recorded by   [LM] Chloé Justice PT    Cognitive Assessment/Intervention    Current Cognitive/Communication Assessment functional  -LM functional  -MC functional  -LM    Orientation Status oriented x 4  -LM oriented x 4  -MC oriented x 4  -LM    Follows Commands/Answers Questions 100% of the time  -% of the time  -% of the time;able to follow single-step instructions;needs cueing  -LM     Personal Safety WNL/WFL  -LM WNL/WFL  -MC WNL/WFL  -LM    Personal Safety Interventions  fall prevention program maintained;gait belt;muscle strengthening facilitated;nonskid shoes/slippers when out of bed;supervised activity  -MC     Recorded by [LM] Chloé Justice PT [MC] Yani Garcia OT [LM] Chloé Justice PT    ROM (Range of Motion)    General ROM Detail R knee ROM 12-76 degrees; lacking 12 degrees knee extension; AAROM flexion measured in sitting  -LM      Recorded by [LM] Chloé Justice PT      General LE Assessment    ROM Detail   R knee measured 16-72 degrees; pt lacking 16 degrees knee extension. AAROM R knee flexion measured in sitting  -LM    Recorded by   [LM] Chloé Justice PT    Mobility Assessment/Training    Extremity Weight-Bearing Status right lower extremity  -LM right lower extremity  -MC right lower extremity  -LM    Right Lower Extremity Weight-Bearing weight-bearing as tolerated  -LM weight-bearing as tolerated  -MC weight-bearing as tolerated  -LM    Recorded by [LM] Chloé Justice PT [MC] Yani Garcia OT [LM] Chloé Justice PT    Bed Mobility, Assessment/Treatment    Bed Mob, Supine to Sit, [place] not tested   Pt received UIC  -LM  not tested   Pt received sitting UIC  -LM    Bed Mob, Sit to Supine, [place] not tested   Pt left UIC  -LM  not tested   Pt left sitting UIC  -LM    Bed Mobility, Comment  NT - UIC  -MC     Recorded by [LM] Chloé Justice PT [MC] Yani Garcia OT [LM] Chloé Justice, PT    Transfer Assessment/Treatment    Transfers, Sit-Stand [place] contact guard assist  -LM contact guard assist  -MC contact guard assist;verbal cues required  -LM    Transfers, Stand-Sit [place] contact guard assist  -LM contact guard assist  -MC contact guard assist;verbal cues required  -LM    Transfers, Sit-Stand-Sit, Assist Device rolling walker  -LM rolling walker  -MC rolling walker  -LM    Toilet Transfer, [place]   contact guard assist;verbal cues  required  -LM    Toilet Transfer, Assistive Device   bedside commode without drop arms;other (see comments)   BSC placed over commode  -LM    Bathtub Transfer, [place]  contact guard assist  -MC     Bathtub Transfer, Assistive Device  rolling walker;transfer tub bench  -MC     Transfer, Safety Issues sequencing ability decreased;step length decreased;weight-shifting ability decreased  -LM sequencing ability decreased;step length decreased;weight-shifting ability decreased  -MC     Transfer, Impairments ROM decreased;strength decreased  -LM ROM decreased;strength decreased;pain  -MC     Transfer, Comment Pt demo increased independence by not requiring verbal cues to push from chair armrests when standing, and reach for armrests when sitting. Pt requires increased time when standing to move hands from armrests to RW handgrips.  -LM Cues for hand placement, safe transfer technique as well as to step RLE out prior to sitting.  Family present for tub transfer teaching  - Verbal cues to push from chair armrests when standing, reach for armrests when sitting. Pt demo increased independence with stepping out surgical leg prior to sitting.  -LM    Recorded by [LM] Chloé Justice PT [MC] Yani Garcia OT [LM] Chloé Justice, PT    Gait Assessment/Treatment    Gait, [place] Level contact guard assist;verbal cues required  -LM  contact guard assist;verbal cues required  -LM    Gait, Assistive Device rolling walker  -LM  rolling walker  -LM    Gait, Distance (Feet) 390  -LM  150  -LM    Gait, Gait Pattern Analysis swing-through gait  -LM  swing-through gait  -LM    Gait, Gait Deviations right:;antalgic;alley decreased;decreased heel strike;forward flexed posture;toe-to-floor clearance decreased;weight-shifting ability decreased  -LM  right:;antalgic;alley decreased;decreased heel strike;forward flexed posture;step length decreased;toe-to-floor clearance decreased;weight-shifting ability decreased  -LM    Gait,  Safety Issues sequencing ability decreased;step length decreased;weight-shifting ability decreased  -LM  sequencing ability decreased;step length decreased;weight-shifting ability decreased  -LM    Gait, Impairments ROM decreased;strength decreased  -LM  decreased flexibility;strength decreased;pain  -LM    Gait, Comment Pt ambulated with step-through gait pattern. Pt required less verbal cues to maintain upright posture when ambulating. Pt demo increased ability to increase weight through RLE. Gait limited by fatigue.  -LM  Pt initially ambulated with step-to gait pattern then progressed to step-through gait pattern with verbal cues and practice. Pt requires repetitive cues for upright posture.   -LM    Recorded by [LM] Chloé Justice, PT  [LM] Chloé Justice, PT    Stairs Assessment/Treatment    Number of Stairs 3  -      Stairs, Handrail Location both sides  -      Stairs, [place] Level contact guard assist;verbal cues required  -      Stairs, Technique Used step to step (descending);step to step (ascending)  -LM      Stairs, Safety Issues sequencing ability decreased;weight-shifting ability decreased  -LM      Stairs, Impairments ROM decreased;strength decreased  -LM      Stairs, Comment Verbal cues for proper stair technique. Pt exhibited step-to pattern utilizing both handrails.  -LM      Recorded by [LM] Chloé Justice, PT      Functional Mobility    Functional Mobility- Ind. Level  contact guard assist  -     Functional Mobility- Device  rolling walker  -     Functional Mobility-Distance (Feet)  --   in tub shower room  -     Functional Mobility- Comment  Cues not to twist or pivot on RLE during turns  -     Recorded by  [] Yani Garcia OT     Therapy Exercises    Exercise Protocols total knee  -  total knee  -LM    Total Knee Exercises right:;15 reps;completed protocol;with assist;ankle pumps/circles;quad set;glut set;heel slide stretch;SLR;SAQ;LAQ   leg  to assist with SLR   -LM  right:;15 reps;completed protocol;with assist;ankle pumps/circles;quad set;glut set;heel slide stretch;SLR;SAQ;LAQ   Jacqueline SLR  -LM    Recorded by [LM] Chloé Justice PT  [LM] Chloé Justice PT    Positioning and Restraints    Pre-Treatment Position sitting in chair/recliner  -LM sitting in chair/recliner  -MC sitting in chair/recliner  -LM    Post Treatment Position chair  -LM chair  -MC chair  -LM    In Chair notified nsg;reclined;sitting;call light within reach;encouraged to call for assist;with family/caregiver  -LM notified nsg;reclined;call light within reach;encouraged to call for assist;with family/caregiver;legs elevated  -MC notified nsg;reclined;sitting;call light within reach;encouraged to call for assist;with family/caregiver   cold pack  -LM    Recorded by [LM] Chloé Justice PT [MC] Yani Garcia OT [LM] Chloé Justice PT      07/11/17 1040          Rehab Assessment/Intervention    Discipline physical therapist  -LM      Document Type therapy note (daily note)  -LM      Subjective Information agree to therapy;complains of;pain  -LM      Patient Effort, Rehab Treatment good  -LM      Symptoms Noted During/After Treatment fatigue  -LM      Precautions/Limitations fall precautions;other (see comments)   R adductor canal nerve catheter  -LM      Recorded by [LM] Chloé Justice PT      Pain Assessment    Pain Assessment 0-10  -LM      Pain Score 6  -LM      Post Pain Score 0  -LM      Pain Type Acute pain  -LM      Pain Location Knee  -LM      Pain Orientation Right;Anterior  -LM      Pain Intervention(s) Repositioned;Ambulation/increased activity  -LM      Recorded by [LM] Chloé Justice PT      Vision Assessment/Intervention    Visual Impairment WFL  -LM      Recorded by [LM] Chloé Justice PT      Cognitive Assessment/Intervention    Current Cognitive/Communication Assessment functional  -LM      Orientation Status oriented x 4  -LM      Follows Commands/Answers Questions 100% of the  time  -LM      Personal Safety WNL/WFL  -LM      Recorded by [LM] Chloé Justice, PT      Mobility Assessment/Training    Extremity Weight-Bearing Status right lower extremity  -LM      Right Lower Extremity Weight-Bearing weight-bearing as tolerated  -LM      Recorded by [LM] Chloé Justice PT      Bed Mobility, Assessment/Treatment    Bed Mob, Supine to Sit, [place] not tested   Pt received sitting UIC  -LM      Bed Mob, Sit to Supine, [place] not tested   Pt left sitting UIC  -LM      Recorded by [LM] Chloé Justice, PT      Transfer Assessment/Treatment    Transfers, Sit-Stand [place] minimum assist (75% patient effort);verbal cues required  -LM      Transfers, Stand-Sit [place] contact guard assist;verbal cues required  -LM      Transfers, Sit-Stand-Sit, Assist Device rolling walker  -LM      Transfer, Comment Verbal cues to push from chair armrests when standing. Pt experienced posterior lean when standing; required increased assistance to maintain upright position. Verbal cues to reach for chair armrests prior to sitting and step out RLE.  -LM      Recorded by [LM] Chloé Justice PT      Gait Assessment/Treatment    Gait, [place] Level contact guard assist;verbal cues required  -LM      Gait, Assistive Device rolling walker  -LM      Gait, Distance (Feet) 92  -LM      Gait, Gait Pattern Analysis swing-through gait  -LM      Gait, Gait Deviations right:;antalgic;alley decreased;decreased heel strike;forward flexed posture;step length decreased;toe-to-floor clearance decreased;weight-shifting ability decreased  -LM      Gait, Safety Issues sequencing ability decreased;step length decreased;weight-shifting ability decreased  -LM      Gait, Impairments ROM decreased;strength decreased;pain  -LM      Gait, Comment Pt ambulated with step-to gait pattern and progressed to step-through gait pattern with verbal cues and practice. Verbal cues to remain within RW bounds and not pushing  it too far forward. Pt with forward flexed posture, repetitive cues for upright posture. Pt expressed decrease in pain and stiffness in R knee with ambulation. Gait limited by fatigue.  -LM      Recorded by [LM] Chloé Justice PT      Therapy Exercises    Exercise Protocols total knee  -LM      Total Knee Exercises right:;15 reps;completed protocol;with assist;quad set;glut set;heel slide stretch;SLR;SAQ;LAQ;ankle pumps/circles   Jacqueline with SLR  -LM      Recorded by [LM] Chloé Justice PT      Positioning and Restraints    Pre-Treatment Position sitting in chair/recliner  -LM      Post Treatment Position chair  -LM      In Chair notified nsg;reclined;sitting;call light within reach;encouraged to call for assist;with family/caregiver   SCDs  -LM      Recorded by [LM] Chloé Justice PT        User Key  (r) = Recorded By, (t) = Taken By, (c) = Cosigned By    Initials Name Effective Dates    FINN Garcia OT 03/14/16 -     LM Chloé Justice PT 06/09/17 -           PT Recommendation and Plan  Anticipated Equipment Needs At Discharge: tub bench  Anticipated Discharge Disposition: home with assist, home with home health  Planned Therapy Interventions: balance training, bed mobility training, gait training, home exercise program, patient/family education, ROM (Range of Motion), stair training, strengthening, transfer training  PT Frequency: 2 times/day  Plan of Care Review  Plan Of Care Reviewed With: patient, family  Progress: progress toward functional goals as expected  Outcome Summary/Follow up Plan: Pt increased ambulation distance to 390 feet with CGA and RW, limited by fatigue. Stair training initated. Pt anticipates discharge home with assist and HHPT today.          Outcome Measures       07/12/17 1105 07/12/17 1025 07/11/17 1416    How much help from another person do you currently need...    Turning from your back to your side while in flat bed without using bedrails? 3  -LM  3  -LM    Moving from lying  on back to sitting on the side of a flat bed without bedrails? 3  -LM  3  -LM    Moving to and from a bed to a chair (including a wheelchair)? 3  -LM  3  -LM    Standing up from a chair using your arms (e.g., wheelchair, bedside chair)? 3  -LM  3  -LM    Climbing 3-5 steps with a railing? 3  -LM  2  -LM    To walk in hospital room? 3  -LM  3  -LM    AM-PAC 6 Clicks Score 18  -LM  17  -LM    How much help from another is currently needed...    Putting on and taking off regular lower body clothing?  3  -MC     Bathing (including washing, rinsing, and drying)  3  -MC     Toileting (which includes using toilet bed pan or urinal)  3  -MC     Putting on and taking off regular upper body clothing  4  -MC     Taking care of personal grooming (such as brushing teeth)  4  -MC     Eating meals  4  -MC     Score  21  -MC     Functional Assessment    Outcome Measure Options AM-PAC 6 Clicks Basic Mobility (PT)  -LM AM-PAC 6 Clicks Daily Activity (OT)  -MC AM-PAC 6 Clicks Basic Mobility (PT)  -LM      07/11/17 1040 07/11/17 0708 07/10/17 1545    How much help from another person do you currently need...    Turning from your back to your side while in flat bed without using bedrails? 3  -LM  3  -LR    Moving from lying on back to sitting on the side of a flat bed without bedrails? 3  -LM  3  -LR    Moving to and from a bed to a chair (including a wheelchair)? 3  -LM  3  -LR    Standing up from a chair using your arms (e.g., wheelchair, bedside chair)? 3  -LM  2  -LR    Climbing 3-5 steps with a railing? 2  -LM  2  -LR    To walk in hospital room? 3  -LM  3  -LR    AM-PAC 6 Clicks Score 17  -LM  16  -LR    How much help from another is currently needed...    Putting on and taking off regular lower body clothing?  3  -ST     Bathing (including washing, rinsing, and drying)  3  -ST     Toileting (which includes using toilet bed pan or urinal)  3  -ST     Putting on and taking off regular upper body clothing  4  -ST     Taking care of  personal grooming (such as brushing teeth)  4  -ST     Eating meals  4  -ST     Score  21  -ST     Functional Assessment    Outcome Measure Options AM-PAC 6 Clicks Basic Mobility (PT)  -LM AM-PAC 6 Clicks Daily Activity (OT)  -ST AM-PAC 6 Clicks Basic Mobility (PT)  -LR      User Key  (r) = Recorded By, (t) = Taken By, (c) = Cosigned By    Initials Name Provider Type    ST Marleni Yeager, OTR Occupational Therapist    LR Cindy Mcgovern, PT Physical Therapist    FINN Garcia, OT Occupational Therapist    LM Chloé Justice, PT Physical Therapist           Time Calculation:         PT Charges       07/12/17 1317          Time Calculation    Start Time 1105  -LM      PT Received On 07/12/17  -LM      PT Goal Re-Cert Due Date 07/20/17  -LM      Time Calculation- PT    Total Timed Code Minutes- PT 26 minute(s)  -LM        User Key  (r) = Recorded By, (t) = Taken By, (c) = Cosigned By    Initials Name Provider Type    LM Chloé Justice, PT Physical Therapist          Therapy Charges for Today     Code Description Service Date Service Provider Modifiers Qty    15777875208 HC GAIT TRAINING EA 15 MIN 7/11/2017 Chloé Justice, PT GP 1    34728637223 HC PT THER PROC EA 15 MIN 7/11/2017 Chloé Justice, PT GP 1    37428690053 HC GAIT TRAINING EA 15 MIN 7/11/2017 Chloé Justice, PT GP 1    46701743569 HC PT THER PROC EA 15 MIN 7/11/2017 Chloé Justice, PT GP 1    37419257013 HC GAIT TRAINING EA 15 MIN 7/12/2017 Chloé Justice, PT GP 1    35731471992 HC PT THER PROC EA 15 MIN 7/12/2017 Chloé Justice, PT GP 1    77708224382 HC PT THER SUPP EA 15 MIN 7/12/2017 Chloé Justice, PT GP 2          PT G-Codes  Outcome Measure Options: AM-PAC 6 Clicks Basic Mobility (PT)    PT Discharge Summary  Anticipated Discharge Disposition: home with assist, home with home health  Reason for Discharge: Discharge from facility  Outcomes Achieved: Patient able to partially acheive established goals  Discharge Destination: Home with  assist, Home with home health    Chloé Justice, PT  7/12/2017

## 2017-07-12 NOTE — NURSING NOTE
Acute Pain Service:  On-Q teaching completed with patient and family member.  Video demonstration, handout and bracelet provided with [facility] on call central phone number.  Instructed to call with any questions or concerns.  Patient verbalized understanding.  Service will continue to follow until catheter DC'd.  Please contact patient at [phone]  if needed.

## 2017-07-12 NOTE — THERAPY DISCHARGE NOTE
Acute Care - Occupational Therapy Treatment Note/Discharge   Page     Patient Name: Kylee Robles  : 1945  MRN: [id number]  Today's Date: 2017  Onset of Illness/Injury or Date of Surgery Date: 07/10/17  Date of Referral to OT: 17  Referring Physician: MD Edwin      Admit Date: 7/10/2017    Visit Dx:     ICD-10-CM ICD-9-CM   1. Impaired functional mobility, balance, gait, and endurance Z74.09 V49.89   2. Impaired mobility and ADLs Z74.09 799.89     Patient Active Problem List   Diagnosis   • Arthritis of knee, right   • Status post total right knee replacement   • GERD (gastroesophageal reflux disease)   • Hyperlipidemia   • HTN (hypertension)   • COPD (chronic obstructive pulmonary disease)             Adult Rehabilitation Note       17 1025 17 1416 17 1040    Rehab Assessment/Intervention    Discipline occupational therapist  -MC physical therapist  -LM physical therapist  -LM    Document Type therapy note (daily note);discharge summary  - therapy note (daily note)  -LM therapy note (daily note)  -LM    Subjective Information agree to therapy;no complaints  - agree to therapy;complains of;pain  -LM agree to therapy;complains of;pain  -LM    Patient Effort, Rehab Treatment good  -MC good  -LM good  -LM    Symptoms Noted During/After Treatment fatigue  -MC fatigue  -LM fatigue  -LM    Precautions/Limitations fall precautions;other (see comments)   Adductor canal nerve cath  -MC fall precautions;other (see comments)   R adductor canal nerve catheter  -LM fall precautions;other (see comments)   R adductor canal nerve catheter  -LM    Recorded by [MC] Yani Garcia OT [LM] Chloé Justice, PT [LM] Chloé Justice, PT    Pain Assessment    Pain Assessment 0-10  -MC 0-10  -LM 0-10  -LM    Pain Score 2  -MC 6  -LM 6  -LM    Post Pain Score 4  -MC 6  -LM 0  -LM    Pain Type Acute pain;Surgical pain  -MC Acute pain  -LM Acute pain  -LM    Pain Location Knee  -MC Knee  -LM Knee   -LM    Pain Orientation Right  -MC Right;Anterior  -LM Right;Anterior  -LM    Pain Intervention(s) Repositioned  -MC Repositioned;Ambulation/increased activity;Cold applied  -LM Repositioned;Ambulation/increased activity  -LM    Recorded by [MC] Yani Garcia OT [LM] Chloé Justice, PT [LM] Chloé Justice PT    Vision Assessment/Intervention    Visual Impairment  WFL  -LM WFL  -LM    Recorded by  [LM] Chloé Justice PT [LM] Chloé Justice PT    Cognitive Assessment/Intervention    Current Cognitive/Communication Assessment functional  -MC functional  -LM functional  -LM    Orientation Status oriented x 4  -MC oriented x 4  -LM oriented x 4  -LM    Follows Commands/Answers Questions 100% of the time  -% of the time;able to follow single-step instructions;needs cueing  -% of the time  -LM    Personal Safety WNL/WFL  -MC WNL/WFL  -LM WNL/WFL  -LM    Personal Safety Interventions fall prevention program maintained;gait belt;muscle strengthening facilitated;nonskid shoes/slippers when out of bed;supervised activity  -MC      Recorded by [MC] Yani Garcia, OT [LM] Chloé Justice, PT [LM] Chloé Justice, PT    General LE Assessment    ROM Detail  R knee measured 16-72 degrees; pt lacking 16 degrees knee extension. AAROM R knee flexion measured in sitting  -LM     Recorded by  [LM] Chloé Justice PT     Mobility Assessment/Training    Extremity Weight-Bearing Status right lower extremity  -MC right lower extremity  -LM right lower extremity  -LM    Right Lower Extremity Weight-Bearing weight-bearing as tolerated  -MC weight-bearing as tolerated  -LM weight-bearing as tolerated  -LM    Recorded by [MC] Yani Garcia OT [LM] Chloé Justice, PT [LM] Chloé Justice, PT    Bed Mobility, Assessment/Treatment    Bed Mob, Supine to Sit, [place]  not tested   Pt received sitting UIC  -LM not tested   Pt received sitting UIC  -LM    Bed Mob, Sit to Supine, [place]  not tested   Pt left sitting UIC  -LM  not tested   Pt left sitting UIC  -LM    Bed Mobility, Comment NT - UIC  -MC      Recorded by [MC] Yani Garcia OT [LM] Chloé Justice, PT [LM] Chloé Justice, PT    Transfer Assessment/Treatment    Transfers, Sit-Stand [place] contact guard assist  -MC contact guard assist;verbal cues required  -LM minimum assist (75% patient effort);verbal cues required  -LM    Transfers, Stand-Sit [place] contact guard assist  -MC contact guard assist;verbal cues required  -LM contact guard assist;verbal cues required  -LM    Transfers, Sit-Stand-Sit, Assist Device rolling walker  -MC rolling walker  -LM rolling walker  -LM    Toilet Transfer, [place]  contact guard assist;verbal cues required  -LM     Toilet Transfer, Assistive Device  bedside commode without drop arms;other (see comments)   BSC placed over commode  -LM     Bathtub Transfer, [place] contact guard assist  -MC      Bathtub Transfer, Assistive Device rolling walker;transfer tub bench  -MC      Transfer, Safety Issues sequencing ability decreased;step length decreased;weight-shifting ability decreased  -MC      Transfer, Impairments ROM decreased;strength decreased;pain  -MC      Transfer, Comment Cues for hand placement, safe transfer technique as well as to step RLE out prior to sitting.  Family present for tub transfer teaching  -MC Verbal cues to push from chair armrests when standing, reach for armrests when sitting. Pt demo increased independence with stepping out surgical leg prior to sitting.  -LM Verbal cues to push from chair armrests when standing. Pt experienced posterior lean when standing; required increased assistance to maintain upright position. Verbal cues to reach for chair armrests prior to sitting and step out RLE.  -LM    Recorded by [MC] Yani Garcia OT [LM] Chloé Justice, PT [LM] Chloé Justice, PT    Gait Assessment/Treatment    Gait, [place] Level  contact guard assist;verbal cues required  -LM contact guard  assist;verbal cues required  -LM    Gait, Assistive Device  rolling walker  -LM rolling walker  -LM    Gait, Distance (Feet)  150  -LM 92  -LM    Gait, Gait Pattern Analysis  swing-through gait  -LM swing-through gait  -LM    Gait, Gait Deviations  right:;antalgic;alley decreased;decreased heel strike;forward flexed posture;step length decreased;toe-to-floor clearance decreased;weight-shifting ability decreased  -LM right:;antalgic;alley decreased;decreased heel strike;forward flexed posture;step length decreased;toe-to-floor clearance decreased;weight-shifting ability decreased  -LM    Gait, Safety Issues  sequencing ability decreased;step length decreased;weight-shifting ability decreased  -LM sequencing ability decreased;step length decreased;weight-shifting ability decreased  -LM    Gait, Impairments  decreased flexibility;strength decreased;pain  -LM ROM decreased;strength decreased;pain  -LM    Gait, Comment  Pt initially ambulated with step-to gait pattern then progressed to step-through gait pattern with verbal cues and practice. Pt requires repetitive cues for upright posture.   -LM Pt ambulated with step-to gait pattern and progressed to step-through gait pattern with verbal cues and practice. Verbal cues to remain within RW bounds and not pushing it too far forward. Pt with forward flexed posture, repetitive cues for upright posture. Pt expressed decrease in pain and stiffness in R knee with ambulation. Gait limited by fatigue.  -LM    Recorded by  [LM] Chloé Justice PT [LM] Chloé Justice PT    Functional Mobility    Functional Mobility- Ind. Level contact guard assist  -      Functional Mobility- Device rolling walker  -      Functional Mobility-Distance (Feet) --   in tub shower room  -      Functional Mobility- Comment Cues not to twist or pivot on RLE during turns  -      Recorded by [] Yani Garcia, YORDAN      Therapy Exercises    Exercise Protocols  total knee  -LM total knee  -LM     Total Knee Exercises  right:;15 reps;completed protocol;with assist;ankle pumps/circles;quad set;glut set;heel slide stretch;SLR;SAQ;LAQ   Jacqueline SLR  -LM right:;15 reps;completed protocol;with assist;quad set;glut set;heel slide stretch;SLR;SAQ;LAQ;ankle pumps/circles   Jacqueline with SLR  -LM    Recorded by  [LM] Chloé Justice, PT [LM] Chloé Justice PT    Positioning and Restraints    Pre-Treatment Position sitting in chair/recliner  -MC sitting in chair/recliner  -LM sitting in chair/recliner  -LM    Post Treatment Position chair  - chair  -LM chair  -LM    In Chair notified nsg;reclined;call light within reach;encouraged to call for assist;with family/caregiver;legs elevated  -MC notified nsg;reclined;sitting;call light within reach;encouraged to call for assist;with family/caregiver   cold pack  -LM notified nsg;reclined;sitting;call light within reach;encouraged to call for assist;with family/caregiver   SCDs  -LM    Recorded by [MC] Yani Garcia, OT [LM] Chloé Justice, PT [LM] Chloé Justice, PT      User Key  (r) = Recorded By, (t) = Taken By, (c) = Cosigned By    Initials Name Effective Dates     Yani Garcia, OT 03/14/16 -     LM Chloé Justice PT 06/09/17 -                 OT Goals       07/12/17 1054 07/11/17 0708       Bed Mobility OT LTG    Bed Mobility OT LTG, Time to Achieve  4 days  -ST     Bed Mobility OT LTG, Activity Type  supine to sit/sit to supine  -ST     Bed Mobility OT LTG, [place] Level  supervision required  -ST     Bed Mobility OT LTG, Assist Device  leg ;bed rails  -ST     Bed Mobility OT LTG, Outcome goal not met  - goal ongoing  -ST     Bed Mobility OT LTG, Reason Goal Not Met discharged from facility  -      Transfer Training OT LTG    Transfer Training OT LTG, Time to Achieve  4 days  -ST     Transfer Training OT LTG, Activity Type  tub  -ST     Transfer Training OT LTG, [place] Level  contact guard assist  -ST     Transfer Training OT LTG, Assist Device   walker, rolling  -ST     Transfer Training OT LTG, Outcome goal met  - goal ongoing  -ST     LB Dressing OT LTG    LB Dressing Goal OT LTG, Time to Achieve  4 days  -ST     LB Dressing Goal OT LTG, [place] Level  conditional independence  -ST     LB Dressing Goal OT LTG, Adaptive Equipment  laces, elastic;reacher;shoe horn, long handled;sock-aid  -ST     LB Dressing Goal OT LTG, Additional Goal  address shoes  -ST     LB Dressing Goal OT LTG, Outcome goal not met  - goal ongoing  -ST     LB Dressing Goal OT LTG, Reason Goal Not Met discharged from facility  -        User Key  (r) = Recorded By, (t) = Taken By, (c) = Cosigned By    Initials Name Provider Type    ST Marleni Yeager, OTR Occupational Therapist     Yani Garcia OT Occupational Therapist          Occupational Therapy Education     Title: PT OT SLP Therapies (Done)     Topic: Occupational Therapy (Done)     Point: ADL training (Done)    Description: Instruct learner(s) on proper safety adaptation and remediation techniques during self care or transfers.   Instruct in proper use of assistive devices.    Learning Progress Summary    Learner Readiness Method Response Comment Documented by Status   Patient Eager E,TBCHRISTIAN,MARIBEL   07/12/17 1053 Done    Acceptance E,TBCHRISTIAN,H VU,DU role of OT, benefits of activity, transfers, ADLs, LBD, AE, toileting  07/11/17 1308 Done   Family Eager EALOK D VU,MARIBEL   07/12/17 1053 Done    Acceptance E,CHRISTIAN DICKINSON,H VU,DU role of OT, benefits of activity, transfers, ADLs, LBD, AE, toileting  07/11/17 1308 Done               Point: Home exercise program (Done)    Description: Instruct learner(s) on appropriate technique for monitoring, assisting and/or progressing therapeutic exercises/activities.    Learning Progress Summary    Learner Readiness Method Response Comment Documented by Status   Patient Acceptance E,TB,CHRISTIAN,H VU,DU role of OT, benefits of activity, transfers, ADLs, LBD, AE, toileting  07/11/17 1308 Done    Family Acceptance E,TB,D,H VU,DU role of OT, benefits of activity, transfers, ADLs, LBD, AE, toileting  07/11/17 1308 Done               Point: Precautions (Done)    Description: Instruct learner(s) on prescribed precautions during self-care and functional transfers.    Learning Progress Summary    Learner Readiness Method Response Comment Documented by Status   Patient Eager E,TB,D VU,DU   07/12/17 1053 Done    Acceptance E,TB,D,H VU,DU role of OT, benefits of activity, transfers, ADLs, LBD, AE, toileting  07/11/17 1308 Done   Family Eager E,TB,D VU,DU   07/12/17 1053 Done    Acceptance E,TB,D,H VU,DU role of OT, benefits of activity, transfers, ADLs, LBD, AE, toileting  07/11/17 1308 Done               Point: Body mechanics (Done)    Description: Instruct learner(s) on proper positioning and spine alignment during self-care, functional mobility activities and/or exercises.    Learning Progress Summary    Learner Readiness Method Response Comment Documented by Status   Patient Eager E,TB,D VU,DU   07/12/17 1053 Done    Acceptance E,TB,D,H VU,DU role of OT, benefits of activity, transfers, ADLs, LBD, AE, toileting  07/11/17 1308 Done   Family Eager E,TB,D VU,DU   07/12/17 1053 Done    Acceptance E,TB,D,H VU,DU role of OT, benefits of activity, transfers, ADLs, LBD, AE, toileting  07/11/17 1308 Done                      User Key     Initials Effective Dates Name Provider Type Discipline     02/20/17 -  Marleni Yeager, OTR Occupational Therapist OT     03/14/16 -  Yani Garcia, OT Occupational Therapist OT                OT Recommendation and Plan  Anticipated Equipment Needs At Discharge:  (TBD; TKR bag issued except sock aid (does not wear socks))  Planned Therapy Interventions: ADL retraining, IADL retraining, balance training, bed mobility training, transfer training  Therapy Frequency: daily  Plan of Care Review  Plan Of Care Reviewed With: patient, family  Progress: improving  Outcome  Summary/Follow up Plan: Pt pending DC home with assist this date.  Not all goals met due to short LOS. Pt demonstrated good understanding of tub shower transfer this date, family verbalized understanding.  If DC does not occur, cont OT POC           Outcome Measures       07/12/17 1025 07/11/17 1416 07/11/17 1040    How much help from another person do you currently need...    Turning from your back to your side while in flat bed without using bedrails?  3  -LM 3  -LM    Moving from lying on back to sitting on the side of a flat bed without bedrails?  3  -LM 3  -LM    Moving to and from a bed to a chair (including a wheelchair)?  3  -LM 3  -LM    Standing up from a chair using your arms (e.g., wheelchair, bedside chair)?  3  -LM 3  -LM    Climbing 3-5 steps with a railing?  2  -LM 2  -LM    To walk in hospital room?  3  -LM 3  -LM    AM-PAC 6 Clicks Score  17  -LM 17  -LM    How much help from another is currently needed...    Putting on and taking off regular lower body clothing? 3  -MC      Bathing (including washing, rinsing, and drying) 3  -MC      Toileting (which includes using toilet bed pan or urinal) 3  -MC      Putting on and taking off regular upper body clothing 4  -MC      Taking care of personal grooming (such as brushing teeth) 4  -MC      Eating meals 4  -MC      Score 21  -MC      Functional Assessment    Outcome Measure Options AM-PAC 6 Clicks Daily Activity (OT)  -MC AM-PAC 6 Clicks Basic Mobility (PT)  -LM AM-PAC 6 Clicks Basic Mobility (PT)  -LM      07/11/17 0708 07/10/17 1545       How much help from another person do you currently need...    Turning from your back to your side while in flat bed without using bedrails?  3  -LR     Moving from lying on back to sitting on the side of a flat bed without bedrails?  3  -LR     Moving to and from a bed to a chair (including a wheelchair)?  3  -LR     Standing up from a chair using your arms (e.g., wheelchair, bedside chair)?  2  -LR     Climbing 3-5  steps with a railing?  2  -LR     To walk in hospital room?  3  -LR     AM-PAC 6 Clicks Score  16  -LR     How much help from another is currently needed...    Putting on and taking off regular lower body clothing? 3  -ST      Bathing (including washing, rinsing, and drying) 3  -ST      Toileting (which includes using toilet bed pan or urinal) 3  -ST      Putting on and taking off regular upper body clothing 4  -ST      Taking care of personal grooming (such as brushing teeth) 4  -ST      Eating meals 4  -ST      Score 21  -ST      Functional Assessment    Outcome Measure Options AM-PAC 6 Clicks Daily Activity (OT)  -ST AM-PAC 6 Clicks Basic Mobility (PT)  -LR       User Key  (r) = Recorded By, (t) = Taken By, (c) = Cosigned By    Initials Name Provider Type     Marleni Yeager, OTR Occupational Therapist    LR Cindy Mcgovern, PT Physical Therapist     Yani Garcia OT Occupational Therapist    DAV Justice, PT Physical Therapist           Time Calculation:          Time Calculation- OT       07/12/17 1056          Time Calculation- OT    OT Start Time 1025  -      Total Timed Code Minutes- OT 18 minute(s)  -      OT Received On 07/12/17  -      OT Goal Re-Cert Due Date 07/21/17  -        User Key  (r) = Recorded By, (t) = Taken By, (c) = Cosigned By    Initials Name Provider Type     Yani Garcia OT Occupational Therapist          Therapy Charges for Today     Code Description Service Date Service Provider Modifiers Qty    94941461423  OT THERAPEUTIC ACT EA 15 MIN 7/12/2017 Yani Garcia OT GO 1                    Yani Garcia OT  7/12/2017

## 2017-07-12 NOTE — PLAN OF CARE
Problem: Patient Care Overview (Adult)  Goal: Plan of Care Review  Outcome: Ongoing (interventions implemented as appropriate)    07/12/17 0410   Coping/Psychosocial Response Interventions   Plan Of Care Reviewed With patient   Patient Care Overview   Progress no change   Outcome Evaluation   Outcome Summary/Follow up Plan Patient tolerating ambulation will assist x1. VSS. Minimal complaints of pain. DC home today.          Problem: Perioperative Period (Adult)  Goal: Signs and Symptoms of Listed Potential Problems Will be Absent or Manageable (Perioperative Period)  Outcome: Ongoing (interventions implemented as appropriate)    Problem: Knee Replacement, Total (Adult)  Goal: Signs and Symptoms of Listed Potential Problems Will be Absent or Manageable (Knee Replacement, Total)  Outcome: Ongoing (interventions implemented as appropriate)

## 2017-07-12 NOTE — PROGRESS NOTES
[place]    Acute pain service Inpatient Progress Note    Patient Name: Kylee Robles  :  1945  MRN:  [id number]        Acute Pain  Service Inpatient Progress Note:    Analgesia:Good  LOC: alert and awake  Resp Status: room air  Cardiac: VS stable  Side Effects:None  Catheter Site:clean  Cath type: peripheral nerve cath(MOOG pump)  Infusion rate: 12ml/hr  Catheter Plan:Catheter to remain Insitu and Continue catheter infusion rate unchanged

## 2017-07-12 NOTE — PLAN OF CARE
Problem: Patient Care Overview (Adult)  Goal: Plan of Care Review  Outcome: Unable to achieve outcome(s) by discharge Date Met:  07/12/17 07/12/17 1054   Coping/Psychosocial Response Interventions   Plan Of Care Reviewed With patient;family   Patient Care Overview   Progress improving   Outcome Evaluation   Outcome Summary/Follow up Plan Pt pending DC home with assist this date. Not all goals met due to short LOS. Pt demonstrated good understanding of tub shower transfer this date, family verbalized understanding. If DC does not occur, cont OT POC          Problem: Inpatient Occupational Therapy  Goal: Bed Mobility Goal LTG- OT  Outcome: Unable to achieve outcome(s) by discharge Date Met:  07/12/17 07/11/17 0708 07/12/17 1054   Bed Mobility OT LTG   Bed Mobility OT LTG, Time to Achieve 4 days --    Bed Mobility OT LTG, Activity Type supine to sit/sit to supine --    Bed Mobility OT LTG, [place] Level supervision required --    Bed Mobility OT LTG, Assist Device leg ;bed rails --    Bed Mobility OT LTG, Outcome --  goal not met   Bed Mobility OT LTG, Reason Goal Not Met --  discharged from facility       Goal: Transfer Training Goal 1 LTG- OT  Outcome: Outcome(s) achieved Date Met:  07/12/17 07/11/17 0708 07/12/17 1054   Transfer Training OT LTG   Transfer Training OT LTG, Time to Achieve 4 days --    Transfer Training OT LTG, Activity Type tub --    Transfer Training OT LTG, [place] Level contact guard assist --    Transfer Training OT LTG, Assist Device walker, rolling --    Transfer Training OT LTG, Outcome --  goal met       Goal: LB Dressing Goal LTG- OT  Outcome: Unable to achieve outcome(s) by discharge Date Met:  07/12/17 07/11/17 0708 07/12/17 1054   LB Dressing OT LTG   LB Dressing Goal OT LTG, Time to Achieve 4 days --    LB Dressing Goal OT LTG, [place] Level conditional independence --    LB Dressing Goal OT LTG, Adaptive Equipment laces, elastic;reacher;shoe horn, long  handled;sock-aid --    LB Dressing Goal OT LTG, Additional Goal address shoes --    LB Dressing Goal OT LTG, Outcome --  goal not met   LB Dressing Goal OT LTG, Reason Goal Not Met --  discharged from facility

## 2017-07-12 NOTE — DISCHARGE SUMMARY
Patient Name: Kylee Robles  MRN: [id number]  : 1945  DOS: 2017    Attending: No att. providers found    Primary Care Provider: Devon Akers MD    Date of Admission:.7/10/2017  7:03 AM    Date of Discharge:  2017    Discharge Diagnosis: Principal Problem:    Status post total right knee replacement  Active Problems:    Arthritis of knee, right    GERD (gastroesophageal reflux disease)    Hyperlipidemia    HTN (hypertension)    COPD (chronic obstructive pulmonary disease)    Acute blood loss anemia,mild, asymptomatic      Hospital Course  Patient is a 72 y.o. female presented for right total knee arthroplasty by Dr. Pineda under spinal anesthesia. She tolerated surgery well and is admitted for further medical management.  She reports severe right knee pain for the last 2 years. She uses a cane to ambulate. Steroid injections or physical therapy failed to provide pain relief.     Patient was provided pain medications as needed for pain control, along with adductor canal nerve block infusion of Ropivacaine.    She was seen by PT and OT and has progressed well over her stay.  She used an IS for atelectasis prophylaxis and aspirin along with mechanicals for DVT prophylaxis.  Home medications were resumed as appropriate, and labs were monitored and remained fairly stable.     With the progress she has made, she is ready for DC home today.    A prineo dressing is in place and is to remain for 2 weeks.  She will have an On Q pump ( instructed on it during this admit)  Discussed with patient regarding plan and she shows understanding and agreement.    Patient will have HHPT following discharge.      Procedures Performed  7/10/2017     Pre-op Diagnosis:   Right knee OA     Post-op Diagnosis:   RIght knee OA     Procedure(s):  RIGHT TOTAL KNEE ARTHROPLASTY      Surgeon(s):  Jad Pineda MD       Pertinent Test Results:    I reviewed the patient's new clinical results.     Results from last 7 days  Lab  "Units 17  0533   WBC 10*3/mm3 6.44   HEMOGLOBIN g/dL 10.1*   HEMATOCRIT % 31.8*   PLATELETS 10*3/mm3 205       Results from last 7 days  Lab Units 17  0533   SODIUM mmol/L 134   POTASSIUM mmol/L 3.6   CHLORIDE mmol/L 104   CO2 mmol/L 25.0   BUN mg/dL 10   CREATININE mg/dL 0.70   CALCIUM mg/dL 9.1   GLUCOSE mg/dL 121*     I reviewed the patient's new imaging including images and reports.      Physical therapy: Pt increased ambulation distance to 390 feet with CGA and RW, limited by fatigue. Stair training initated. Pt anticipates discharge home with assist and HHPT today.    Discharge Assessment:    Vital Signs  /71 (BP Location: Left arm, Patient Position: Sitting)  Pulse 75  Temp 97.9 °F (36.6 °C) (Tympanic)   Resp 16  Ht 66\" (167.6 cm)  Wt 201 lb (91.2 kg)  SpO2 97%  BMI 32.44 kg/m2  Temp (24hrs), Av.9 °F (36.6 °C), Min:97.9 °F (36.6 °C), Max:97.9 °F (36.6 °C)      General Appearance:    Alert, cooperative, in no acute distress   Lungs:     Clear to auscultation,respirations regular, even and                   unlabored    Heart:    Regular rhythm and normal rate, normal S1 and S2   Abdomen:     Normal bowel sounds, no masses, no organomegaly, soft        non-tender, non-distended, no guarding, no rebound                 tenderness   Extremities:   Right knee Prineo CDI. Nerve block present   Pulses:   Pulses palpable and equal bilaterally   Skin:   No bleeding, bruising or rash   Neurologic:   Cranial nerves 2 - 12 grossly intact, sensation intact. Flexion and dorsiflexion intact bilateral feet.       Discharge Disposition: Home with HHPT    Discharge Medications   Kylee Robles   Home Medication Instructions KAY:[id number]    Printed on:17 0784   Medication Information                      albuterol (PROVENTIL HFA;VENTOLIN HFA) 108 (90 BASE) MCG/ACT inhaler  Inhale 2 puffs Every 4 (Four) Hours As Needed for Wheezing.             aspirin  MG EC tablet  Take 1 tablet by " mouth Daily. For 1 month             docusate sodium 100 MG capsule  Take 100 mg by mouth 2 (Two) Times a Day.             HYDROcodone-acetaminophen (NORCO) 5-325 MG per tablet  Take 1 tablet by mouth Every 4 (Four) Hours As Needed for Moderate Pain (4-6) for up to 8 days.             losartan (COZAAR) 100 MG tablet  Take 100 mg by mouth Daily.             pravastatin (PRAVACHOL) 40 MG tablet  Take 40 mg by mouth Daily.             propranolol LA (INDERAL LA) 120 MG 24 hr capsule  Take 120 mg by mouth Daily.             raNITIdine (ZANTAC) 150 MG tablet  Take 150 mg by mouth 2 (Two) Times a Day.             ropivacaine (NAROPIN) 0.2 %  24 mg/hr by Peripheral Nerve route Continuous.             Umeclidinium-Vilanterol (ANORO ELLIPTA) 62.5-25 MCG/INH aerosol powder   Inhale 1 puff Daily As Needed.                 Discharge Diet:   Diet Instructions     REGULAR DIET           regular diet    Activity at Discharge:   Activity Instructions     WEIGHT BEARING AS TOLERATED RIGHT LOWER EXTREMITY           WBAT RLE    Follow-up Appointments  Dr. Pineda per his orders    Discharge took over 30 min.  Seen and examined by me. Agree with above. Discussed with patient. Answered all questions.   Plan formulated with APRN as above.     Saad Oneal MD  07/12/17  10:50 PM

## 2017-07-13 NOTE — PROGRESS NOTES
AGIULA Overton    Nerve Cath Post Op Call    Patient Name: Kylee Robles  :  1945  MRN:  [id number]  Date of Discharge: 2017    Nerve Cath Post Op Call:    Analgesia:Good  Side Effects:None  Catheter Site:clean  Patient Controlled ON Q pump infusion rate: 12ml/hr  Catheter Plan:Will continue with plan at home without changes and Modify plan as follows (see note)

## 2017-07-14 NOTE — PROGRESS NOTES
AGUILA Overton    Nerve Cath Post Op Call    Patient Name: Kylee Robles  :  1945  MRN:  [id number]  Date of Discharge: 2017    Nerve Cath Post Op Call:    Catheter Plan:Patient/Family member report nerve catheter previously discontinued, tip intact

## 2018-11-05 ENCOUNTER — HOSPITAL ENCOUNTER (OUTPATIENT)
Age: 73
End: 2018-11-05
Payer: MEDICARE

## 2018-11-05 DIAGNOSIS — J44.1: Primary | ICD-10-CM

## 2018-11-05 PROCEDURE — 71046 X-RAY EXAM CHEST 2 VIEWS: CPT

## 2019-04-25 ENCOUNTER — HOSPITAL ENCOUNTER (OUTPATIENT)
Age: 74
End: 2019-04-25
Payer: MEDICARE

## 2019-04-25 DIAGNOSIS — E87.5: Primary | ICD-10-CM

## 2019-04-25 LAB — POTASSIUM: 5.4 MMOL/L (ref 3.5–5.1)

## 2019-04-25 PROCEDURE — 84132 ASSAY OF SERUM POTASSIUM: CPT

## 2019-11-13 ENCOUNTER — HOSPITAL ENCOUNTER (OUTPATIENT)
Age: 74
End: 2019-11-13
Payer: MEDICARE

## 2019-11-13 DIAGNOSIS — M25.531: ICD-10-CM

## 2019-11-13 DIAGNOSIS — R07.81: Primary | ICD-10-CM

## 2019-11-13 PROCEDURE — 71101 X-RAY EXAM UNILAT RIBS/CHEST: CPT

## 2019-11-13 PROCEDURE — 73110 X-RAY EXAM OF WRIST: CPT

## 2019-12-21 NOTE — H&P
Patient Name: Kylee Robles  MRN: [id number]  : 1945  DOS: 7/10/2017    Attending: Jad Pineda MD    Primary Care Provider: Devon Akers MD      Chief complaint:  Right knee pain    Subjective   Patient is a 72 y.o. female presented for right total knee arthroplasty by Dr. Pineda under spinal anesthesia. She tolerated surgery well and is admitted for further medical management.  She reports severe right knee pain for the last 2 years.  She uses a cane to ambulate.  Steroid injections or physical therapy failed to provide pain relief.     When seen postop she is having nausea.  She denies pain. She denies shortness of breath or chest pain. No hx of DVT or PE.  (Seen in her room afterwards, her pain and nausea have been brought under control.  She has already ambulated with physical therapy at distance of 100 feet with rolling walker, appears to have done fairly well.  Currently sitting in her recliner.  Has used her incentive spirometer couple of times already.  No chest pain no shortness breath.)wy    Allergies:  No Known Allergies    Meds:  Prescriptions Prior to Admission   Medication Sig Dispense Refill Last Dose   • albuterol (PROVENTIL HFA;VENTOLIN HFA) 108 (90 BASE) MCG/ACT inhaler Inhale 2 puffs Every 4 (Four) Hours As Needed for Wheezing.   Past Week at Unknown time   • losartan (COZAAR) 100 MG tablet Take 100 mg by mouth Daily.   7/10/2017 at 0430   • pravastatin (PRAVACHOL) 40 MG tablet Take 40 mg by mouth Daily.   2017 at 2030   • propranolol LA (INDERAL LA) 120 MG 24 hr capsule Take 120 mg by mouth Daily.   2017 at 2030   • raNITIdine (ZANTAC) 150 MG tablet Take 150 mg by mouth 2 (Two) Times a Day.   7/10/2017 at 0430   • Umeclidinium-Vilanterol (ANORO ELLIPTA) 62.5-25 MCG/INH aerosol powder  Inhale 1 puff Daily As Needed.          History:   Past Medical History:   Diagnosis Date   • Anemia    • Arthritis    • Cancer     SKIN - BASALCELL- EAR   • GERD (gastroesophageal reflux  disease)    • High cholesterol    • History of transfusion     2016   • Hypertension      Past Surgical History:   Procedure Laterality Date   • COLONOSCOPY W/ POLYPECTOMY     • ENDOSCOPY       History reviewed. No pertinent family history.  Social History   Substance Use Topics   • Smoking status: Never Smoker   • Smokeless tobacco: Never Used   • Alcohol use No   She lives with her son.  She has 3 children.  She is retired from laundry mat service.    Review of Systems  All systems were reviewed and negative except for:  Respiratory: positive for  shortness of air with exertion    Vital Signs  /75 (BP Location: Right arm, Patient Position: Lying)  Pulse 58  Temp 96.5 °F (35.8 °C) (Temporal Artery )   Resp 18  SpO2 100%    Physical Exam:    General Appearance:    Alert, cooperative, in no acute distress   Head:    Normocephalic, without obvious abnormality, atraumatic   Eyes:            Lids and lashes normal, conjunctivae and sclerae normal, no   icterus, no pallor, corneas clear   Ears:    Ears appear intact with no abnormalities noted   Neck:   No adenopathy, supple, trachea midline, no thyromegaly, no     carotid bruit, no JVD   Lungs:     Clear to auscultation,respirations regular, even and                   unlabored    Heart:    Regular rhythm and normal rate, normal S1 and S2, no            murmur, no gallop, no rub, no click   Abdomen:     Normal bowel sounds, no masses, no organomegaly, soft        non-tender, non-distended, no guarding, no rebound                 tenderness   Genitalia:    Deferred   Extremities:   Right knee Ace wrap clean dry intact.  Nerve block present    Pulses:   Pulses palpable and equal bilaterally   Skin:   No bleeding, bruising or rash   Neurologic:   Cranial nerves 2 - 12 grossly intact, sensation intact. Flexion and dorsiflexion intact bilateral feet.        I reviewed the patient's new clinical results.         Results from last 7 days  Lab Units 07/05/17  4961    WBC 10*3/mm3 4.50   HEMOGLOBIN g/dL 11.5   HEMATOCRIT % 35.9   PLATELETS 10*3/mm3 257       Results from last 7 days  Lab Units 07/05/17  0955   SODIUM mmol/L 136   POTASSIUM mmol/L 4.4   CHLORIDE mmol/L 97*   CO2 mmol/L 23.0   BUN mg/dL 18   CREATININE mg/dL 0.80   CALCIUM mg/dL 9.4   BILIRUBIN mg/dL 0.4   ALK PHOS U/L 161*   ALT (SGPT) U/L 11   AST (SGOT) U/L 19   GLUCOSE mg/dL 100     Lab Results   Component Value Date    HGBA1C 5.30 07/05/2017       Assessment and Plan:   Principal Problem:    Status post total right knee replacement  Active Problems:    Arthritis of knee, right    GERD (gastroesophageal reflux disease)    Hyperlipidemia    HTN (hypertension)    COPD (chronic obstructive pulmonary disease)      Plan  1. PT/OT- early ambulation post op  2. Pain control-prns   3. IS-encourage  4. DVT proph- mechs/ASA  5. Bowel regimen  6. Resume home medications as appropriate  7. Monitor post-op labs  8. DC planning for home with HHPT  Additional antiemetics PRN    GERD  - Continue home ranitidine  Hyperlipidemia, hypertension  - Continue home Cozaar, Inderal and Pravachol  - Monitor BP q4 hrs  - PRN hydralazine for SBP >170  COPD  - Continue home PRN albuterol and Ellipta inhaler  - Monitor O2 sats    Seen and examined by me. Agree with above. Planned formulated with APRN .  Discussed with patient, her daughter and granddaughter.     Saad Oneal MD  07/10/17  7:24 PM   Attending

## 2020-01-27 ENCOUNTER — HOSPITAL ENCOUNTER (OUTPATIENT)
Age: 75
End: 2020-01-27
Payer: MEDICARE

## 2020-01-27 DIAGNOSIS — M54.31: Primary | ICD-10-CM

## 2020-01-27 PROCEDURE — 72110 X-RAY EXAM L-2 SPINE 4/>VWS: CPT

## 2020-02-25 ENCOUNTER — HOSPITAL ENCOUNTER (OUTPATIENT)
Age: 75
End: 2020-02-25
Payer: MEDICARE

## 2020-02-25 DIAGNOSIS — R05: ICD-10-CM

## 2020-02-25 DIAGNOSIS — J06.9: Primary | ICD-10-CM

## 2020-02-25 DIAGNOSIS — Z20.828: ICD-10-CM

## 2020-02-25 PROCEDURE — 87486 CHLMYD PNEUM DNA AMP PROBE: CPT

## 2020-02-25 PROCEDURE — 87633 RESP VIRUS 12-25 TARGETS: CPT

## 2020-02-25 PROCEDURE — 87798 DETECT AGENT NOS DNA AMP: CPT

## 2020-02-25 PROCEDURE — 87581 M.PNEUMON DNA AMP PROBE: CPT

## 2020-07-08 ENCOUNTER — HOSPITAL ENCOUNTER (OUTPATIENT)
Age: 75
End: 2020-07-08
Payer: MEDICARE

## 2020-07-08 DIAGNOSIS — Z03.818: Primary | ICD-10-CM

## 2020-07-08 DIAGNOSIS — R06.02: ICD-10-CM

## 2020-07-08 DIAGNOSIS — R05: ICD-10-CM

## 2020-07-08 LAB
HCT VFR BLD CALC: 37.5 % (ref 37–47)
HGB BLD-MCNC: 12.2 G/DL (ref 12.2–16.2)
MCHC RBC-ENTMCNC: 32.4 G/DL (ref 31.8–35.4)
MCV RBC: 90.5 FL (ref 81–99)
MEAN CORPUSCULAR HEMOGLOBIN: 29.4 PG (ref 27–31.2)
PLATELET # BLD: 303 K/MM3 (ref 142–424)
RBC # BLD AUTO: 4.15 M/MM3 (ref 4.2–5.4)
WBC # BLD AUTO: 4.9 K/MM3 (ref 4.8–10.8)

## 2020-07-08 PROCEDURE — U0004 COV-19 TEST NON-CDC HGH THRU: HCPCS

## 2020-07-08 PROCEDURE — 85025 COMPLETE CBC W/AUTO DIFF WBC: CPT

## 2020-07-08 PROCEDURE — 36415 COLL VENOUS BLD VENIPUNCTURE: CPT

## 2020-07-08 PROCEDURE — 71045 X-RAY EXAM CHEST 1 VIEW: CPT

## 2020-07-10 LAB — COVID-19 NASAL PCR SENDOUT LEX: NOT DETECTED

## 2020-07-15 ENCOUNTER — HOSPITAL ENCOUNTER (OUTPATIENT)
Age: 75
End: 2020-07-15
Payer: MEDICARE

## 2020-07-15 DIAGNOSIS — Z01.818: Primary | ICD-10-CM

## 2020-07-15 LAB
BUN SERPL-MCNC: 14 MG/DL (ref 7–17)
CREAT BLD-SCNC: 0.8 MG/DL (ref 0.52–1.04)
ESTIMATED GLOMERULAR FILT RATE: 70 ML/MIN (ref 60–?)
GFR (AFRICAN AMERICAN): 84 ML/MIN (ref 60–?)

## 2020-07-15 PROCEDURE — 82565 ASSAY OF CREATININE: CPT

## 2020-07-15 PROCEDURE — 36415 COLL VENOUS BLD VENIPUNCTURE: CPT

## 2020-07-15 PROCEDURE — 84520 ASSAY OF UREA NITROGEN: CPT

## 2020-07-16 ENCOUNTER — HOSPITAL ENCOUNTER (OUTPATIENT)
Age: 75
End: 2020-07-16
Payer: MEDICARE

## 2020-07-16 DIAGNOSIS — R93.89: Primary | ICD-10-CM

## 2020-07-16 DIAGNOSIS — R05: ICD-10-CM

## 2020-07-16 PROCEDURE — 71260 CT THORAX DX C+: CPT

## 2020-07-16 PROCEDURE — 36415 COLL VENOUS BLD VENIPUNCTURE: CPT

## 2020-07-16 PROCEDURE — 82565 ASSAY OF CREATININE: CPT

## 2020-07-16 PROCEDURE — 84520 ASSAY OF UREA NITROGEN: CPT

## 2021-03-25 ENCOUNTER — HOSPITAL ENCOUNTER (OUTPATIENT)
Dept: HOSPITAL 22 - ER | Age: 76
Setting detail: OBSERVATION
LOS: 2 days | Discharge: HOME | End: 2021-03-27
Payer: MEDICARE

## 2021-03-25 VITALS
TEMPERATURE: 98.06 F | SYSTOLIC BLOOD PRESSURE: 200 MMHG | DIASTOLIC BLOOD PRESSURE: 97 MMHG | OXYGEN SATURATION: 94 % | HEART RATE: 80 BPM | RESPIRATION RATE: 14 BRPM

## 2021-03-25 VITALS
RESPIRATION RATE: 18 BRPM | DIASTOLIC BLOOD PRESSURE: 90 MMHG | OXYGEN SATURATION: 98 % | HEART RATE: 68 BPM | SYSTOLIC BLOOD PRESSURE: 172 MMHG | TEMPERATURE: 98 F

## 2021-03-25 VITALS
TEMPERATURE: 97.88 F | OXYGEN SATURATION: 97 % | HEART RATE: 73 BPM | SYSTOLIC BLOOD PRESSURE: 121 MMHG | RESPIRATION RATE: 16 BRPM | DIASTOLIC BLOOD PRESSURE: 83 MMHG

## 2021-03-25 VITALS
DIASTOLIC BLOOD PRESSURE: 69 MMHG | SYSTOLIC BLOOD PRESSURE: 134 MMHG | OXYGEN SATURATION: 93 % | HEART RATE: 74 BPM | RESPIRATION RATE: 17 BRPM

## 2021-03-25 VITALS
RESPIRATION RATE: 16 BRPM | DIASTOLIC BLOOD PRESSURE: 63 MMHG | SYSTOLIC BLOOD PRESSURE: 139 MMHG | TEMPERATURE: 98.24 F | OXYGEN SATURATION: 97 % | HEART RATE: 86 BPM

## 2021-03-25 VITALS
OXYGEN SATURATION: 90 % | TEMPERATURE: 97.88 F | DIASTOLIC BLOOD PRESSURE: 65 MMHG | HEART RATE: 76 BPM | RESPIRATION RATE: 18 BRPM | SYSTOLIC BLOOD PRESSURE: 128 MMHG

## 2021-03-25 VITALS
TEMPERATURE: 97.88 F | DIASTOLIC BLOOD PRESSURE: 76 MMHG | HEART RATE: 70 BPM | SYSTOLIC BLOOD PRESSURE: 134 MMHG | OXYGEN SATURATION: 93 % | RESPIRATION RATE: 16 BRPM

## 2021-03-25 VITALS
OXYGEN SATURATION: 97 % | HEART RATE: 74 BPM | SYSTOLIC BLOOD PRESSURE: 127 MMHG | RESPIRATION RATE: 16 BRPM | TEMPERATURE: 97.9 F | DIASTOLIC BLOOD PRESSURE: 74 MMHG

## 2021-03-25 VITALS
TEMPERATURE: 97.88 F | OXYGEN SATURATION: 96 % | SYSTOLIC BLOOD PRESSURE: 129 MMHG | DIASTOLIC BLOOD PRESSURE: 80 MMHG | RESPIRATION RATE: 18 BRPM | HEART RATE: 70 BPM

## 2021-03-25 VITALS
RESPIRATION RATE: 16 BRPM | TEMPERATURE: 98.42 F | OXYGEN SATURATION: 95 % | SYSTOLIC BLOOD PRESSURE: 115 MMHG | DIASTOLIC BLOOD PRESSURE: 51 MMHG | HEART RATE: 60 BPM

## 2021-03-25 VITALS
TEMPERATURE: 97.88 F | DIASTOLIC BLOOD PRESSURE: 67 MMHG | SYSTOLIC BLOOD PRESSURE: 142 MMHG | HEART RATE: 66 BPM | OXYGEN SATURATION: 97 % | RESPIRATION RATE: 18 BRPM

## 2021-03-25 VITALS
OXYGEN SATURATION: 93 % | HEART RATE: 75 BPM | RESPIRATION RATE: 18 BRPM | DIASTOLIC BLOOD PRESSURE: 79 MMHG | TEMPERATURE: 97.88 F | SYSTOLIC BLOOD PRESSURE: 138 MMHG

## 2021-03-25 VITALS — BODY MASS INDEX: 32.9 KG/M2 | BODY MASS INDEX: 34.9 KG/M2 | BODY MASS INDEX: 31.9 KG/M2

## 2021-03-25 VITALS
DIASTOLIC BLOOD PRESSURE: 61 MMHG | RESPIRATION RATE: 22 BRPM | SYSTOLIC BLOOD PRESSURE: 114 MMHG | OXYGEN SATURATION: 98 %

## 2021-03-25 VITALS — HEART RATE: 72 BPM

## 2021-03-25 VITALS
HEART RATE: 70 BPM | SYSTOLIC BLOOD PRESSURE: 132 MMHG | DIASTOLIC BLOOD PRESSURE: 64 MMHG | RESPIRATION RATE: 18 BRPM | OXYGEN SATURATION: 93 %

## 2021-03-25 VITALS — HEART RATE: 73 BPM | OXYGEN SATURATION: 98 % | DIASTOLIC BLOOD PRESSURE: 81 MMHG | SYSTOLIC BLOOD PRESSURE: 174 MMHG

## 2021-03-25 VITALS
SYSTOLIC BLOOD PRESSURE: 115 MMHG | OXYGEN SATURATION: 98 % | HEART RATE: 70 BPM | RESPIRATION RATE: 18 BRPM | DIASTOLIC BLOOD PRESSURE: 66 MMHG

## 2021-03-25 VITALS
OXYGEN SATURATION: 93 % | DIASTOLIC BLOOD PRESSURE: 72 MMHG | HEART RATE: 73 BPM | SYSTOLIC BLOOD PRESSURE: 138 MMHG | RESPIRATION RATE: 18 BRPM

## 2021-03-25 VITALS
DIASTOLIC BLOOD PRESSURE: 63 MMHG | HEART RATE: 72 BPM | OXYGEN SATURATION: 99 % | RESPIRATION RATE: 18 BRPM | SYSTOLIC BLOOD PRESSURE: 115 MMHG

## 2021-03-25 VITALS
DIASTOLIC BLOOD PRESSURE: 58 MMHG | HEART RATE: 80 BPM | RESPIRATION RATE: 18 BRPM | SYSTOLIC BLOOD PRESSURE: 119 MMHG | OXYGEN SATURATION: 90 %

## 2021-03-25 VITALS
SYSTOLIC BLOOD PRESSURE: 128 MMHG | DIASTOLIC BLOOD PRESSURE: 61 MMHG | RESPIRATION RATE: 18 BRPM | HEART RATE: 71 BPM | OXYGEN SATURATION: 96 % | TEMPERATURE: 98.2 F

## 2021-03-25 VITALS — SYSTOLIC BLOOD PRESSURE: 146 MMHG | OXYGEN SATURATION: 98 % | DIASTOLIC BLOOD PRESSURE: 68 MMHG | HEART RATE: 72 BPM

## 2021-03-25 VITALS — HEART RATE: 70 BPM

## 2021-03-25 DIAGNOSIS — I49.5: ICD-10-CM

## 2021-03-25 DIAGNOSIS — Z82.49: ICD-10-CM

## 2021-03-25 DIAGNOSIS — Z79.899: ICD-10-CM

## 2021-03-25 DIAGNOSIS — J44.9: ICD-10-CM

## 2021-03-25 DIAGNOSIS — I25.110: Primary | ICD-10-CM

## 2021-03-25 DIAGNOSIS — I50.31: ICD-10-CM

## 2021-03-25 DIAGNOSIS — I48.91: ICD-10-CM

## 2021-03-25 DIAGNOSIS — I11.0: ICD-10-CM

## 2021-03-25 DIAGNOSIS — E78.5: ICD-10-CM

## 2021-03-25 LAB
ALBUMIN LEVEL: 4.2 G/DL (ref 3.5–5)
ALBUMIN/GLOB SERPL: 1.5 {RATIO} (ref 1.1–1.8)
ALP ISO SERPL-ACNC: 160 U/L (ref 38–126)
ALT SERPLBLD-CCNC: 12 U/L (ref 12–78)
ANION GAP SERPL CALC-SCNC: 11 MEQ/L (ref 5–15)
AST SERPL QL: 23 U/L (ref 14–36)
BILIRUBIN,TOTAL: 0.7 MG/DL (ref 0.2–1.3)
BUN SERPL-MCNC: 21 MG/DL (ref 7–17)
CALCIUM SPEC-MCNC: 9.2 MG/DL (ref 8.4–10.2)
CHLORIDE SPEC-SCNC: 105 MMOL/L (ref 98–107)
CO2 SERPL-SCNC: 23 MMOL/L (ref 22–30)
CREAT BLD-SCNC: 0.7 MG/DL (ref 0.52–1.04)
CREATININE CLEARANCE ESTIMATED: 67 ML/MIN (ref 50–200)
CRP SERPL HS-MCNC: 9.6 MG/L (ref 0–4)
ESTIMATED GLOMERULAR FILT RATE: 81 ML/MIN (ref 60–?)
GFR (AFRICAN AMERICAN): 98 ML/MIN (ref 60–?)
GLOBULIN SER CALC-MCNC: 2.8 G/DL (ref 1.3–3.2)
GLUCOSE: 113 MG/DL (ref 74–100)
HCT VFR BLD CALC: 40.4 % (ref 37–47)
HGB BLD-MCNC: 13.4 G/DL (ref 12.2–16.2)
MCHC RBC-ENTMCNC: 33.1 G/DL (ref 31.8–35.4)
MCV RBC: 88.4 FL (ref 81–99)
MEAN CORPUSCULAR HEMOGLOBIN: 29.3 PG (ref 27–31.2)
PLATELET # BLD: 300 K/MM3 (ref 142–424)
POTASSIUM: 4 MMOL/L (ref 3.5–5.1)
PROCALCITONIN: 0.06 NG/ML (ref 0–2)
PROT SERPL-MCNC: 7 G/DL (ref 6.3–8.2)
RBC # BLD AUTO: 4.57 M/MM3 (ref 4.2–5.4)
SODIUM SPEC-SCNC: 135 MMOL/L (ref 136–145)
TROPONIN I: < 0.01 NG/ML (ref 0–0.03)
WBC # BLD AUTO: 10.3 K/MM3 (ref 4.8–10.8)

## 2021-03-25 PROCEDURE — 93005 ELECTROCARDIOGRAM TRACING: CPT

## 2021-03-25 PROCEDURE — 99152 MOD SED SAME PHYS/QHP 5/>YRS: CPT

## 2021-03-25 PROCEDURE — 99284 EMERGENCY DEPT VISIT MOD MDM: CPT

## 2021-03-25 PROCEDURE — 87798 DETECT AGENT NOS DNA AMP: CPT

## 2021-03-25 PROCEDURE — 84484 ASSAY OF TROPONIN QUANT: CPT

## 2021-03-25 PROCEDURE — 87581 M.PNEUMON DNA AMP PROBE: CPT

## 2021-03-25 PROCEDURE — C1769 GUIDE WIRE: HCPCS

## 2021-03-25 PROCEDURE — 87633 RESP VIRUS 12-25 TARGETS: CPT

## 2021-03-25 PROCEDURE — 85651 RBC SED RATE NONAUTOMATED: CPT

## 2021-03-25 PROCEDURE — C1785 PMKR, DUAL, RATE-RESP: HCPCS

## 2021-03-25 PROCEDURE — 85025 COMPLETE CBC W/AUTO DIFF WBC: CPT

## 2021-03-25 PROCEDURE — 93306 TTE W/DOPPLER COMPLETE: CPT

## 2021-03-25 PROCEDURE — 80048 BASIC METABOLIC PNL TOTAL CA: CPT

## 2021-03-25 PROCEDURE — 96365 THER/PROPH/DIAG IV INF INIT: CPT

## 2021-03-25 PROCEDURE — 86140 C-REACTIVE PROTEIN: CPT

## 2021-03-25 PROCEDURE — 93458 L HRT ARTERY/VENTRICLE ANGIO: CPT

## 2021-03-25 PROCEDURE — 71045 X-RAY EXAM CHEST 1 VIEW: CPT

## 2021-03-25 PROCEDURE — 80061 LIPID PANEL: CPT

## 2021-03-25 PROCEDURE — 96375 TX/PRO/DX INJ NEW DRUG ADDON: CPT

## 2021-03-25 PROCEDURE — C1725 CATH, TRANSLUMIN NON-LASER: HCPCS

## 2021-03-25 PROCEDURE — 36415 COLL VENOUS BLD VENIPUNCTURE: CPT

## 2021-03-25 PROCEDURE — 80053 COMPREHEN METABOLIC PANEL: CPT

## 2021-03-25 PROCEDURE — C1898 LEAD, PMKR, OTHER THAN TRANS: HCPCS

## 2021-03-25 PROCEDURE — G0378 HOSPITAL OBSERVATION PER HR: HCPCS

## 2021-03-25 PROCEDURE — 71046 X-RAY EXAM CHEST 2 VIEWS: CPT

## 2021-03-25 PROCEDURE — 84145 PROCALCITONIN (PCT): CPT

## 2021-03-25 PROCEDURE — 83735 ASSAY OF MAGNESIUM: CPT

## 2021-03-25 PROCEDURE — 33208 INSRT HEART PM ATRIAL & VENT: CPT

## 2021-03-25 NOTE — PC.NURSE
PT IS RESTING IN BED. NO COMPLAINTS OF DISCOMFORT. ALERT AND ORIENTED X4. PT HAS BEEN AMBULATING TO THE BATHROOM WITH WALKER. ALL CATH VSS. PT HAS DIURESED WELL THIS SHIFT. LUNG SOUNDS CLEAR. ABDOMEN SOFT/NON TENDER WITH ACTIVE BOWEL SOUNDS.

## 2021-03-26 VITALS
DIASTOLIC BLOOD PRESSURE: 71 MMHG | RESPIRATION RATE: 16 BRPM | OXYGEN SATURATION: 96 % | HEART RATE: 74 BPM | SYSTOLIC BLOOD PRESSURE: 137 MMHG | TEMPERATURE: 97.9 F

## 2021-03-26 VITALS
OXYGEN SATURATION: 95 % | RESPIRATION RATE: 18 BRPM | SYSTOLIC BLOOD PRESSURE: 90 MMHG | HEART RATE: 80 BPM | TEMPERATURE: 98 F | DIASTOLIC BLOOD PRESSURE: 50 MMHG

## 2021-03-26 VITALS
SYSTOLIC BLOOD PRESSURE: 112 MMHG | DIASTOLIC BLOOD PRESSURE: 60 MMHG | RESPIRATION RATE: 14 BRPM | OXYGEN SATURATION: 96 % | TEMPERATURE: 97.52 F | HEART RATE: 78 BPM

## 2021-03-26 VITALS
TEMPERATURE: 97.88 F | RESPIRATION RATE: 16 BRPM | DIASTOLIC BLOOD PRESSURE: 60 MMHG | SYSTOLIC BLOOD PRESSURE: 124 MMHG | HEART RATE: 80 BPM | OXYGEN SATURATION: 96 %

## 2021-03-26 VITALS
DIASTOLIC BLOOD PRESSURE: 70 MMHG | RESPIRATION RATE: 16 BRPM | HEART RATE: 70 BPM | TEMPERATURE: 97.6 F | SYSTOLIC BLOOD PRESSURE: 127 MMHG | OXYGEN SATURATION: 97 %

## 2021-03-26 VITALS
RESPIRATION RATE: 16 BRPM | HEART RATE: 55 BPM | SYSTOLIC BLOOD PRESSURE: 104 MMHG | DIASTOLIC BLOOD PRESSURE: 56 MMHG | OXYGEN SATURATION: 94 % | TEMPERATURE: 98.2 F

## 2021-03-26 VITALS
RESPIRATION RATE: 16 BRPM | SYSTOLIC BLOOD PRESSURE: 138 MMHG | TEMPERATURE: 97.6 F | OXYGEN SATURATION: 96 % | HEART RATE: 71 BPM | DIASTOLIC BLOOD PRESSURE: 76 MMHG

## 2021-03-26 VITALS
DIASTOLIC BLOOD PRESSURE: 62 MMHG | RESPIRATION RATE: 18 BRPM | HEART RATE: 83 BPM | OXYGEN SATURATION: 96 % | SYSTOLIC BLOOD PRESSURE: 107 MMHG

## 2021-03-26 VITALS
HEART RATE: 71 BPM | RESPIRATION RATE: 14 BRPM | OXYGEN SATURATION: 94 % | DIASTOLIC BLOOD PRESSURE: 69 MMHG | TEMPERATURE: 97.9 F | SYSTOLIC BLOOD PRESSURE: 137 MMHG

## 2021-03-26 VITALS
TEMPERATURE: 97.88 F | DIASTOLIC BLOOD PRESSURE: 63 MMHG | SYSTOLIC BLOOD PRESSURE: 153 MMHG | OXYGEN SATURATION: 98 % | RESPIRATION RATE: 14 BRPM | HEART RATE: 70 BPM

## 2021-03-26 VITALS
TEMPERATURE: 98.24 F | RESPIRATION RATE: 16 BRPM | OXYGEN SATURATION: 96 % | DIASTOLIC BLOOD PRESSURE: 66 MMHG | HEART RATE: 57 BPM | SYSTOLIC BLOOD PRESSURE: 123 MMHG

## 2021-03-26 VITALS
DIASTOLIC BLOOD PRESSURE: 63 MMHG | TEMPERATURE: 97.88 F | SYSTOLIC BLOOD PRESSURE: 118 MMHG | HEART RATE: 72 BPM | OXYGEN SATURATION: 96 % | RESPIRATION RATE: 14 BRPM

## 2021-03-26 VITALS — HEART RATE: 70 BPM

## 2021-03-26 VITALS
DIASTOLIC BLOOD PRESSURE: 65 MMHG | RESPIRATION RATE: 16 BRPM | TEMPERATURE: 97.8 F | HEART RATE: 83 BPM | SYSTOLIC BLOOD PRESSURE: 144 MMHG | OXYGEN SATURATION: 96 %

## 2021-03-26 VITALS
OXYGEN SATURATION: 97 % | SYSTOLIC BLOOD PRESSURE: 100 MMHG | TEMPERATURE: 97.88 F | DIASTOLIC BLOOD PRESSURE: 52 MMHG | HEART RATE: 73 BPM | RESPIRATION RATE: 14 BRPM

## 2021-03-26 LAB
ANION GAP SERPL CALC-SCNC: 12.6 MEQ/L (ref 5–15)
BUN SERPL-MCNC: 19 MG/DL (ref 7–17)
CALCIUM SPEC-MCNC: 9 MG/DL (ref 8.4–10.2)
CHLORIDE SPEC-SCNC: 101 MMOL/L (ref 98–107)
CHOLEST SPEC-SCNC: 145 MG/DL (ref 140–200)
CO2 SERPL-SCNC: 25 MMOL/L (ref 22–30)
CREAT BLD-SCNC: 1 MG/DL (ref 0.52–1.04)
CREATININE CLEARANCE ESTIMATED: 64 ML/MIN (ref 50–200)
ESTIMATED GLOMERULAR FILT RATE: 54 ML/MIN (ref 60–?)
GFR (AFRICAN AMERICAN): 65 ML/MIN (ref 60–?)
GLUCOSE: 95 MG/DL (ref 74–100)
HCT VFR BLD CALC: 37.8 % (ref 37–47)
HDLC SERPL-MCNC: 59 MG/DL (ref 40–60)
HGB BLD-MCNC: 12.6 G/DL (ref 12.2–16.2)
MAGNESIUM: 1.7 MG/DL (ref 1.6–2.3)
MCHC RBC-ENTMCNC: 33.4 G/DL (ref 31.8–35.4)
MCV RBC: 88 FL (ref 81–99)
MEAN CORPUSCULAR HEMOGLOBIN: 29.4 PG (ref 27–31.2)
PLATELET # BLD: 257 K/MM3 (ref 142–424)
POTASSIUM: 3.6 MMOL/L (ref 3.5–5.1)
RBC # BLD AUTO: 4.3 M/MM3 (ref 4.2–5.4)
SODIUM SPEC-SCNC: 135 MMOL/L (ref 136–145)
TRIGLYCERIDES: 76 MG/DL (ref 30–150)
WBC # BLD AUTO: 8.5 K/MM3 (ref 4.8–10.8)

## 2021-03-26 NOTE — PC.NURSE
Pt c/o chest pain this am. Pt had previously noted to be in Afib. Pt was asked if she has ever had Afib in her past and she stated no. EKG was obtained and read by ER MD. On call MD was notified. Pt stated,  It feels like pleurisy.  New orders

## 2021-03-26 NOTE — PC.NURSE
Pt has been pleasant and cooperative this shift. A&O X4. No complaints of pain or SOA. Pt is post-operative from pacemaker placement. LT chest dressing is C/D/I. Pt is on room air with sats. >90%. Lungs CTA. No edema noted. Telemetry reveals NSR.

## 2021-03-27 VITALS
OXYGEN SATURATION: 97 % | DIASTOLIC BLOOD PRESSURE: 69 MMHG | SYSTOLIC BLOOD PRESSURE: 123 MMHG | RESPIRATION RATE: 16 BRPM | HEART RATE: 89 BPM | TEMPERATURE: 98.4 F

## 2021-03-27 VITALS
DIASTOLIC BLOOD PRESSURE: 64 MMHG | OXYGEN SATURATION: 94 % | TEMPERATURE: 98.2 F | RESPIRATION RATE: 16 BRPM | SYSTOLIC BLOOD PRESSURE: 117 MMHG | HEART RATE: 90 BPM

## 2021-03-27 VITALS
DIASTOLIC BLOOD PRESSURE: 49 MMHG | OXYGEN SATURATION: 96 % | RESPIRATION RATE: 17 BRPM | HEART RATE: 91 BPM | SYSTOLIC BLOOD PRESSURE: 106 MMHG | TEMPERATURE: 98.1 F

## 2021-03-27 LAB
HCT VFR BLD CALC: 37.4 % (ref 37–47)
HGB BLD-MCNC: 12.6 G/DL (ref 12.2–16.2)
MCHC RBC-ENTMCNC: 33.8 G/DL (ref 31.8–35.4)
MCV RBC: 87 FL (ref 81–99)
MEAN CORPUSCULAR HEMOGLOBIN: 29.4 PG (ref 27–31.2)
PLATELET # BLD: 260 K/MM3 (ref 142–424)
RBC # BLD AUTO: 4.3 M/MM3 (ref 4.2–5.4)
WBC # BLD AUTO: 7.4 K/MM3 (ref 4.8–10.8)

## 2021-03-27 NOTE — PC.NURSE
Pt has slept at intervals this shift. Has c/i discomfort to legs and back. Pt states she hasn't been able to get comfortable in bed tonight. Medicated per mar and assisted with repositioning. Incision to (L) side of chest with DSG intact. No

## 2021-04-08 ENCOUNTER — HOSPITAL ENCOUNTER (OUTPATIENT)
Age: 76
End: 2021-04-08
Payer: MEDICARE

## 2021-04-08 DIAGNOSIS — J44.1: Primary | ICD-10-CM

## 2021-04-08 LAB
HCT VFR BLD CALC: 39.1 % (ref 37–47)
HGB BLD-MCNC: 12.6 G/DL (ref 12.2–16.2)
MCHC RBC-ENTMCNC: 32.2 G/DL (ref 31.8–35.4)
MCV RBC: 88.9 FL (ref 81–99)
MEAN CORPUSCULAR HEMOGLOBIN: 28.6 PG (ref 27–31.2)
PLATELET # BLD: 255 K/MM3 (ref 142–424)
RBC # BLD AUTO: 4.39 M/MM3 (ref 4.2–5.4)
WBC # BLD AUTO: 5.6 K/MM3 (ref 4.8–10.8)

## 2021-04-08 PROCEDURE — 85025 COMPLETE CBC W/AUTO DIFF WBC: CPT

## 2021-04-08 PROCEDURE — 36415 COLL VENOUS BLD VENIPUNCTURE: CPT

## 2021-11-21 ENCOUNTER — HOSPITAL ENCOUNTER (EMERGENCY)
Age: 76
Discharge: HOME | End: 2021-11-21
Payer: MEDICARE

## 2021-11-21 VITALS
RESPIRATION RATE: 18 BRPM | SYSTOLIC BLOOD PRESSURE: 152 MMHG | TEMPERATURE: 98.24 F | HEART RATE: 60 BPM | OXYGEN SATURATION: 98 % | DIASTOLIC BLOOD PRESSURE: 74 MMHG

## 2021-11-21 VITALS
HEART RATE: 60 BPM | RESPIRATION RATE: 18 BRPM | OXYGEN SATURATION: 98 % | SYSTOLIC BLOOD PRESSURE: 166 MMHG | DIASTOLIC BLOOD PRESSURE: 66 MMHG

## 2021-11-21 VITALS
OXYGEN SATURATION: 98 % | DIASTOLIC BLOOD PRESSURE: 125 MMHG | RESPIRATION RATE: 18 BRPM | SYSTOLIC BLOOD PRESSURE: 144 MMHG | HEART RATE: 60 BPM

## 2021-11-21 VITALS — HEART RATE: 62 BPM | DIASTOLIC BLOOD PRESSURE: 74 MMHG | OXYGEN SATURATION: 98 % | SYSTOLIC BLOOD PRESSURE: 152 MMHG

## 2021-11-21 VITALS
HEART RATE: 60 BPM | SYSTOLIC BLOOD PRESSURE: 150 MMHG | DIASTOLIC BLOOD PRESSURE: 51 MMHG | RESPIRATION RATE: 18 BRPM | OXYGEN SATURATION: 98 %

## 2021-11-21 VITALS
RESPIRATION RATE: 20 BRPM | HEART RATE: 58 BPM | DIASTOLIC BLOOD PRESSURE: 81 MMHG | SYSTOLIC BLOOD PRESSURE: 135 MMHG | OXYGEN SATURATION: 97 %

## 2021-11-21 VITALS
TEMPERATURE: 98.2 F | OXYGEN SATURATION: 98 % | RESPIRATION RATE: 18 BRPM | DIASTOLIC BLOOD PRESSURE: 82 MMHG | SYSTOLIC BLOOD PRESSURE: 132 MMHG | HEART RATE: 64 BPM

## 2021-11-21 VITALS — BODY MASS INDEX: 31.6 KG/M2

## 2021-11-21 DIAGNOSIS — E78.5: ICD-10-CM

## 2021-11-21 DIAGNOSIS — N30.00: Primary | ICD-10-CM

## 2021-11-21 DIAGNOSIS — R20.0: ICD-10-CM

## 2021-11-21 DIAGNOSIS — J44.9: ICD-10-CM

## 2021-11-21 DIAGNOSIS — Z20.822: ICD-10-CM

## 2021-11-21 LAB
ANION GAP SERPL CALC-SCNC: 10 MEQ/L (ref 5–15)
BACTERIA URNS QL MICRO: (no result) /LPF
BUN SERPL-MCNC: 16 MG/DL (ref 7–17)
CALCIUM SPEC-MCNC: 9.2 MG/DL (ref 8.4–10.2)
CHLORIDE SPEC-SCNC: 102 MMOL/L (ref 98–107)
CO2 SERPL-SCNC: 29 MMOL/L (ref 22–30)
COLOR UR: YELLOW
CREAT BLD-SCNC: 0.9 MG/DL (ref 0.52–1.04)
CREATININE CLEARANCE ESTIMATED: 65 ML/MIN (ref 50–200)
ESTIMATED GLOMERULAR FILT RATE: 61 ML/MIN (ref 60–?)
GFR (AFRICAN AMERICAN): 73 ML/MIN (ref 60–?)
GLUCOSE: 94 MG/DL (ref 74–100)
HCT VFR BLD CALC: 40.5 % (ref 37–47)
HGB BLD-MCNC: 13.4 G/DL (ref 12.2–16.2)
LEUKOCYTE ESTERASE UR QL STRIP: (no result)
MCHC RBC-ENTMCNC: 33 G/DL (ref 31.8–35.4)
MCV RBC: 90.9 FL (ref 81–99)
MEAN CORPUSCULAR HEMOGLOBIN: 30 PG (ref 27–31.2)
MICRO URNS: (no result)
PH UR: 6 [PH] (ref 5–8.5)
PLATELET # BLD: 256 K/MM3 (ref 142–424)
POTASSIUM: 4 MMOL/L (ref 3.5–5.1)
RBC # BLD AUTO: 4.46 M/MM3 (ref 4.2–5.4)
SODIUM SPEC-SCNC: 137 MMOL/L (ref 136–145)
SP GR UR: 1.01 (ref 1–1.03)
SQUAMOUS URNS QL MICRO: (no result) #/HPF (ref 0–5)
TROPONIN I: < 0.01 NG/ML (ref 0–0.03)
TROPONIN I: < 0.01 NG/ML (ref 0–0.03)
UROBILINOGEN UR QL: 0.2 EU/DL
WBC # BLD AUTO: 5 K/MM3 (ref 4.8–10.8)
WBC # UR: (no result) #/HPF (ref 0–3)

## 2021-11-21 PROCEDURE — 70498 CT ANGIOGRAPHY NECK: CPT

## 2021-11-21 PROCEDURE — 71046 X-RAY EXAM CHEST 2 VIEWS: CPT

## 2021-11-21 PROCEDURE — U0005 INFEC AGEN DETEC AMPLI PROBE: HCPCS

## 2021-11-21 PROCEDURE — 99284 EMERGENCY DEPT VISIT MOD MDM: CPT

## 2021-11-21 PROCEDURE — 93005 ELECTROCARDIOGRAM TRACING: CPT

## 2021-11-21 PROCEDURE — 96365 THER/PROPH/DIAG IV INF INIT: CPT

## 2021-11-21 PROCEDURE — 36415 COLL VENOUS BLD VENIPUNCTURE: CPT

## 2021-11-21 PROCEDURE — 70496 CT ANGIOGRAPHY HEAD: CPT

## 2021-11-21 PROCEDURE — 85025 COMPLETE CBC W/AUTO DIFF WBC: CPT

## 2021-11-21 PROCEDURE — 84484 ASSAY OF TROPONIN QUANT: CPT

## 2021-11-21 PROCEDURE — U0003 INFECTIOUS AGENT DETECTION BY NUCLEIC ACID (DNA OR RNA); SEVERE ACUTE RESPIRATORY SYNDROME CORONAVIRUS 2 (SARS-COV-2) (CORONAVIRUS DISEASE [COVID-19]), AMPLIFIED PROBE TECHNIQUE, MAKING USE OF HIGH THROUGHPUT TECHNOLOGIES AS DESCRIBED BY CMS-2020-01-R: HCPCS

## 2021-11-21 PROCEDURE — 70450 CT HEAD/BRAIN W/O DYE: CPT

## 2021-11-21 PROCEDURE — C9803 HOPD COVID-19 SPEC COLLECT: HCPCS

## 2021-11-21 PROCEDURE — 87086 URINE CULTURE/COLONY COUNT: CPT

## 2021-11-21 PROCEDURE — 80048 BASIC METABOLIC PNL TOTAL CA: CPT

## 2021-11-21 PROCEDURE — 81001 URINALYSIS AUTO W/SCOPE: CPT

## 2022-04-02 ENCOUNTER — HOSPITAL ENCOUNTER (OUTPATIENT)
Age: 77
End: 2022-04-02
Payer: MEDICARE

## 2022-04-02 DIAGNOSIS — J02.9: ICD-10-CM

## 2022-04-02 DIAGNOSIS — Z20.822: Primary | ICD-10-CM

## 2022-04-02 LAB
HCT VFR BLD CALC: 39.2 % (ref 37–47)
HGB BLD-MCNC: 12.9 G/DL (ref 12.2–16.2)
MCHC RBC-ENTMCNC: 33 G/DL (ref 31.8–35.4)
MCV RBC: 90.3 FL (ref 81–99)
MEAN CORPUSCULAR HEMOGLOBIN: 29.8 PG (ref 27–31.2)
PLATELET # BLD: 246 K/MM3 (ref 142–424)
RBC # BLD AUTO: 4.34 M/MM3 (ref 4.2–5.4)
WBC # BLD AUTO: 7.1 K/MM3 (ref 4.8–10.8)

## 2022-04-02 PROCEDURE — U0003 INFECTIOUS AGENT DETECTION BY NUCLEIC ACID (DNA OR RNA); SEVERE ACUTE RESPIRATORY SYNDROME CORONAVIRUS 2 (SARS-COV-2) (CORONAVIRUS DISEASE [COVID-19]), AMPLIFIED PROBE TECHNIQUE, MAKING USE OF HIGH THROUGHPUT TECHNOLOGIES AS DESCRIBED BY CMS-2020-01-R: HCPCS

## 2022-04-02 PROCEDURE — 87275 INFLUENZA B AG IF: CPT

## 2022-04-02 PROCEDURE — 87276 INFLUENZA A AG IF: CPT

## 2022-04-02 PROCEDURE — 87430 STREP A AG IA: CPT

## 2022-04-02 PROCEDURE — C9803 HOPD COVID-19 SPEC COLLECT: HCPCS

## 2022-04-02 PROCEDURE — 85025 COMPLETE CBC W/AUTO DIFF WBC: CPT

## 2022-04-02 PROCEDURE — 36415 COLL VENOUS BLD VENIPUNCTURE: CPT

## 2022-04-02 PROCEDURE — U0005 INFEC AGEN DETEC AMPLI PROBE: HCPCS

## 2022-06-14 ENCOUNTER — HOSPITAL ENCOUNTER (OUTPATIENT)
Age: 77
End: 2022-06-14
Payer: MEDICARE

## 2022-06-14 DIAGNOSIS — R05.9: Primary | ICD-10-CM

## 2022-06-14 PROCEDURE — 71046 X-RAY EXAM CHEST 2 VIEWS: CPT

## 2022-06-23 ENCOUNTER — HOSPITAL ENCOUNTER (OUTPATIENT)
Age: 77
End: 2022-06-23
Payer: MEDICARE

## 2022-06-23 DIAGNOSIS — J44.9: Primary | ICD-10-CM

## 2022-06-23 DIAGNOSIS — R05.9: ICD-10-CM

## 2022-06-23 PROCEDURE — 94060 EVALUATION OF WHEEZING: CPT

## 2022-06-23 PROCEDURE — 94729 DIFFUSING CAPACITY: CPT

## 2022-06-23 PROCEDURE — 94727 GAS DIL/WSHOT DETER LNG VOL: CPT

## 2022-08-25 ENCOUNTER — HOSPITAL ENCOUNTER (EMERGENCY)
Age: 77
Discharge: HOME | End: 2022-08-25
Payer: MEDICARE

## 2022-08-25 VITALS
RESPIRATION RATE: 18 BRPM | SYSTOLIC BLOOD PRESSURE: 141 MMHG | HEART RATE: 85 BPM | OXYGEN SATURATION: 98 % | DIASTOLIC BLOOD PRESSURE: 89 MMHG | TEMPERATURE: 98.9 F

## 2022-08-25 VITALS — BODY MASS INDEX: 37.6 KG/M2

## 2022-08-25 VITALS
DIASTOLIC BLOOD PRESSURE: 89 MMHG | RESPIRATION RATE: 18 BRPM | SYSTOLIC BLOOD PRESSURE: 141 MMHG | OXYGEN SATURATION: 98 % | HEART RATE: 85 BPM | TEMPERATURE: 98.9 F

## 2022-08-25 DIAGNOSIS — J32.9: ICD-10-CM

## 2022-08-25 DIAGNOSIS — J02.9: Primary | ICD-10-CM

## 2022-08-25 PROCEDURE — G0463 HOSPITAL OUTPT CLINIC VISIT: HCPCS

## 2022-08-25 PROCEDURE — 99212 OFFICE O/P EST SF 10 MIN: CPT

## 2022-08-25 PROCEDURE — 87880 STREP A ASSAY W/OPTIC: CPT

## 2023-01-10 ENCOUNTER — HOSPITAL ENCOUNTER (OUTPATIENT)
Age: 78
End: 2023-01-10
Payer: MEDICARE

## 2023-01-10 DIAGNOSIS — Z12.31: Primary | ICD-10-CM

## 2023-01-10 DIAGNOSIS — Z78.0: ICD-10-CM

## 2023-01-10 PROCEDURE — 77067 SCR MAMMO BI INCL CAD: CPT

## 2023-01-10 PROCEDURE — 77063 BREAST TOMOSYNTHESIS BI: CPT

## 2023-01-10 PROCEDURE — 77080 DXA BONE DENSITY AXIAL: CPT

## 2023-07-27 ENCOUNTER — HOSPITAL ENCOUNTER (OUTPATIENT)
Age: 78
End: 2023-07-27
Payer: MEDICARE

## 2023-07-27 DIAGNOSIS — I10: ICD-10-CM

## 2023-07-27 DIAGNOSIS — Z95.0: ICD-10-CM

## 2023-07-27 DIAGNOSIS — I48.0: ICD-10-CM

## 2023-07-27 DIAGNOSIS — E78.5: Primary | ICD-10-CM

## 2023-07-27 PROCEDURE — 93306 TTE W/DOPPLER COMPLETE: CPT

## 2024-02-20 ENCOUNTER — HOSPITAL ENCOUNTER (EMERGENCY)
Age: 79
Discharge: HOME | End: 2024-02-20
Payer: MEDICARE

## 2024-02-20 VITALS
HEART RATE: 53 BPM | TEMPERATURE: 97.7 F | DIASTOLIC BLOOD PRESSURE: 68 MMHG | SYSTOLIC BLOOD PRESSURE: 146 MMHG | RESPIRATION RATE: 12 BRPM | OXYGEN SATURATION: 97 %

## 2024-02-20 VITALS
SYSTOLIC BLOOD PRESSURE: 146 MMHG | TEMPERATURE: 97.7 F | RESPIRATION RATE: 12 BRPM | HEART RATE: 53 BPM | OXYGEN SATURATION: 97 % | DIASTOLIC BLOOD PRESSURE: 68 MMHG

## 2024-02-20 VITALS — BODY MASS INDEX: 28.8 KG/M2

## 2024-02-20 DIAGNOSIS — Z95.0: ICD-10-CM

## 2024-02-20 DIAGNOSIS — J44.9: ICD-10-CM

## 2024-02-20 DIAGNOSIS — E78.5: ICD-10-CM

## 2024-02-20 DIAGNOSIS — I10: ICD-10-CM

## 2024-02-20 DIAGNOSIS — L25.9: Primary | ICD-10-CM

## 2024-02-20 PROCEDURE — G0463 HOSPITAL OUTPT CLINIC VISIT: HCPCS

## 2024-02-20 PROCEDURE — 99212 OFFICE O/P EST SF 10 MIN: CPT

## 2024-02-20 PROCEDURE — 99214 OFFICE O/P EST MOD 30 MIN: CPT

## 2024-02-20 NOTE — EXP.UTC
Discharge Plan
Disposition
Patient Disposition: Home, Self-Care

Condition: Good

Prescriptions
Prescriptions:
New
  triamcinolone acetonide 0.1 % cream 
   1 applic topical BID PRN (Reason: itching) Qty: 30 0RF
  methylprednisolone 4 mg Tablets,Dose Pack 
   4 mg PO AS DIRECTED 6 Days Qty: 21 0RF
   Rx Instructions:
   Take 1 pack as directed for 6 days

No Action
  Stiolto Respimat 2.5-2.5 mcg/actuation mist 
   2 puff inhalation DAILY 
   Patient Comments:
   INHALE 2 PUFFS BY MOUTH EVERY 24 HOURS
  Eliquis 2.5 mg tablet 
   See Rx Instructions  .ROUTE .COMPLEX Qty: 180 1RF
   Dose Instruction:
   Take 1 tablet by mouth twice daily
   Rx Instructions:
   Take 1 tablet by mouth twice daily
  losartan 50 mg tablet 
   See Rx Instructions  .ROUTE .COMPLEX Qty: 90 3RF
   Dose Instruction:
   Take 1 tablet by mouth once daily
   Rx Instructions:
   Take 1 tablet by mouth once daily
  omeprazole 40 MG capsule,delayed release(DR/EC) 
   40 mg PO DAILY 
  propranolol 120 MG capsule,extended release 24 hr 
   240 mg PO DAILY 
  umeclidinium-vilanterol 1 EACH blister with device 
   1 puff IH DAILY 
  pravastatin 40 MG tablet 
   40 mg PO HS 
  ferrous sulfate 325 MG tablet 
   325 mg PO BID 
  diltiazem HCl 120 MG capsule,extended release 24hr 
   120 mg PO DAILY Qty: 30 0RF
  furosemide 20 MG tablet 
   20 mg PO DAILY Qty: 30 0RF

Referrals
Follow up/Referrals:
Clif Licea MD [Primary Care Provider] - See instructions

Activity Restrictions/Add. Instructions
Additional Instructions/Restrictions:
Try to identify and avoid contact with the offending substance.

Don't start the oral steroids until tomorrow. 

Don't put the topical steroids (triamcinolone) on your face or your groin.

Follow up with your regular doctor.

***GO TO THE ER FOR ANY WORSENING SYMPTOMS OR CONCERNS***

Clinical Impressions
Clinical Impression:
 Contact dermatitis


Instructions
Patient Instructions:  Contact Dermatitis

Discharge
ED Provider: Raymond Yates


[facility]
General
Stated complaint: rash on back
Time Seen by Provider: 02/20/24 19:35
Related Data
Home Medications

 Medication  Instructions  Recorded  Confirmed
ferrous sulfate 325 mg (65 mg 325 mg PO BID iron supplement 03/25/21 02/20/24
iron) tablet   
omeprazole 40 mg capsule,delayed 40 mg PO DAILY acid reflux 03/25/21 02/20/24
release   
pravastatin 40 mg tablet 40 mg PO HS Cholesterol 03/25/21 02/20/24
propranolol 120 mg capsule,24 240 mg PO DAILY High blood pressure 03/25/21 02/20/24
hr,extended release   
umeclidinium 62.5 mcg-vilanterol 1 puff inhalation DAILY COPD 03/25/21 02/20/24
25 mcg/actuation powdr for   
inhalation   
tiotropium 2.5 mcg-olodaterol 2.5 2 puff inhalation DAILY 07/13/23 02/20/24
mcg/actuation mist for inhalation   
(Stiolto Respimat)   

Previous Rx's

 Medication  Instructions  Recorded
diltiazem HCl 120 mg 120 mg PO DAILY ##30 03/27/21
capsule,extended release 24 hr  
furosemide 20 mg tablet 20 mg PO DAILY #30 tabs 03/27/21
apixaban 2.5 mg tablet (Eliquis) See Rx Instructions .Route 05/16/22
 .COMPLEX #180 tabs 
losartan 50 mg tablet See Rx Instructions .Route 03/17/23
 .COMPLEX #90 tabs 
methylprednisolone 4 mg tablets in 4 mg PO AS DIRECTED 6 days #21 tabs 02/20/24
a dose pack  
triamcinolone acetonide 0.1 % 1 applic topical BID PRN itching 02/20/24
topical cream #30 grams 


Allergies

Allergy/AdvReac Type Severity Reaction Status Date / Time
No Known Allergies Allergy   Verified 02/20/24 19:53



[facility]
Disclaimer: 
The information contained in this section may have been updated after the patient was seen, as this information can be updated by other users.

Medical History (Reviewed 01/22/24 @ 12:53 by Josee Clay)

Cardiac pacemaker in situ
COPD (chronic obstructive pulmonary disease)
Daytime somnolence
Dizziness
HTN (hypertension)
Hyperlipidemia
Hypertension


Social History (Reviewed 02/20/24 @ 19:53 by Gladys Melvin RN)
Smoking Status:  Never smoker 
alcohol intake:  never 
substance use type:  denies use 
current occupational status:  retired 
Travel in the last 8 weeks:  None 
household members:  family 
housing:  house 



ROS Obtained: Yes All systems reviewed & no additional complaints except as documented
Constitutional
Constitutional: Denies chills and Denies fever(s)
Eyes
Eyes: Denies eye discharge
ENT
Ears, Nose, Mouth, and Throat: Denies dizziness, Denies otalgia and Denies sore throat
Cardiovascular
Cardiovascular: Denies chest pain
Respiratory
Respiratory: Denies shortness of breath, Denies chest congestion, Denies cough, Denies stridor and Denies wheezing
Gastrointestinal
Gastrointestingal: Denies nausea or vomiting
Musculoskeletal
Musculoskeletal: Reports system reviewed and no additional complaints, except as documented and Denies arthralgias
Integumentary/Breasts
Skin/Breast: Reports as per HPI and Reports rash
Neurologic
Neurologic: Denies dizziness and Denies paresthesias
Allergic/Immunologic
Allergic/Immunologic: Denies wheezing

Physical Exam
General
General appearance: alert and in no apparent distress
Head
Head exam: atraumatic, normocephalic and normal inspection
Eye
Eye exam: Present normal appearance, PERRL and EOMI
ENT
ENT exam: Present normal exam, normal oropharynx, mucous membranes moist, TM's normal bilaterally and normal external ear exam
Neck
Neck exam: Present normal inspection, full ROM and trachea midline; Absent meningismus or lymphadenopathy
Chest
Chest inspection: Present normal inspection and symmetric chest wall rise; Absent tenderness
Respiratory
Respiratory exam: Present normal lung sounds bilaterally; Absent respiratory distress
Cardiovascular
Cardiovascular exam: Present regular rate and normal rhythm; Absent JVD
Abdominal Exam
Abdominal exam: Present soft and normal bowel sounds; Absent distention, tenderness or guarding
Extremities Exam
Extremities exam: Present normal inspection, full ROM and normal capillary refill; Absent calf tenderness
Back Exam
Back exam: Present normal inspection; Absent tenderness
Neurological Exam
Neurological exam: Present alert and oriented X3
Psychiatric
Psychiatric exam: Present normal affect and normal mood
Skin
Skin exam: Present rash (there is a patch of maculopapular lesions on her back that measures 6 cm diameter, there are multiple sparse maculopapular lesions on her lower back, buttocks and thighs. No vesicles noted. no induration noted, no open 
wounds or drainage noted. )
Lymphatic
Lymphatic Findings: no adenopathy

Medical Decision Making
Medical Records
Medical records reviewed: No I reviewed the patient's medical records.
[place] Inquiry
Pt receiving controlled substance: No
Medical Decision Narrative: 
Her skin lesions do not follow a zoster-like pattern. They are widespread on her back, buttocks, and both thighs. No vesicles noted. So, this doesn't appear to be shingles.

## 2024-02-20 NOTE — ED_ITS
Discharge Plan    
Disposition    
Patient Disposition: Home, Self-Care    
    
Condition: Good    
    
Prescriptions    
Prescriptions:    
New    
  triamcinolone acetonide 0.1 % cream     
   1 applic topical BID PRN (Reason: itching) Qty: 30 0RF    
  methylprednisolone 4 mg Tablets,Dose Pack     
   4 mg PO AS DIRECTED 6 Days Qty: 21 0RF    
   Rx Instructions:    
   Take 1 pack as directed for 6 days    
    
No Action    
  Stiolto Respimat 2.5-2.5 mcg/actuation mist     
   2 puff inhalation DAILY     
   Patient Comments:    
   INHALE 2 PUFFS BY MOUTH EVERY 24 HOURS    
  Eliquis 2.5 mg tablet     
   See Rx Instructions  .ROUTE .COMPLEX Qty: 180 1RF    
   Dose Instruction:    
   Take 1 tablet by mouth twice daily    
   Rx Instructions:    
   Take 1 tablet by mouth twice daily    
  losartan 50 mg tablet     
   See Rx Instructions  .ROUTE .COMPLEX Qty: 90 3RF    
   Dose Instruction:    
   Take 1 tablet by mouth once daily    
   Rx Instructions:    
   Take 1 tablet by mouth once daily    
  omeprazole 40 MG capsule,delayed release(DR/EC)     
   40 mg PO DAILY     
  propranolol 120 MG capsule,extended release 24 hr     
   240 mg PO DAILY     
  umeclidinium-vilanterol 1 EACH blister with device     
   1 puff IH DAILY     
  pravastatin 40 MG tablet     
   40 mg PO HS     
  ferrous sulfate 325 MG tablet     
   325 mg PO BID     
  diltiazem HCl 120 MG capsule,extended release 24hr     
   120 mg PO DAILY Qty: 30 0RF    
  furosemide 20 MG tablet     
   20 mg PO DAILY Qty: 30 0RF    
    
Referrals    
Follow up/Referrals:    
Clif Licea MD [Primary Care Provider] - See instructions    
    
Activity Restrictions/Add. Instructions    
Additional Instructions/Restrictions:    
Try to identify and avoid contact with the offending substance.    
    
Don't start the oral steroids until tomorrow.     
    
Don't put the topical steroids (triamcinolone) on your face or your groin.    
    
Follow up with your regular doctor.    
    
***GO TO THE ER FOR ANY WORSENING SYMPTOMS OR CONCERNS***    
    
Clinical Impressions    
Clinical Impression:    
 Contact dermatitis    
    
    
Instructions    
Patient Instructions:  Contact Dermatitis    
    
Discharge    
ED Provider: Raymond Yates    
    
    
[facility]    
General    
Stated complaint: rash on back    
Time Seen by Provider: 02/20/24 19:35    
Related Data    
                                Home Medications    
    
    
    
 Medication  Instructions  Recorded  Confirmed    
     
ferrous sulfate 325 mg (65 mg 325 mg PO BID iron supplement 03/25/21 02/20/24    
    
iron) tablet       
     
omeprazole 40 mg capsule,delayed 40 mg PO DAILY acid reflux 03/25/21 02/20/24    
    
release       
     
pravastatin 40 mg tablet 40 mg PO HS Cholesterol 03/25/21 02/20/24    
     
propranolol 120 mg capsule,24 240 mg PO DAILY High blood pressure 03/25/21 02/20/24    
    
hr,extended release       
     
umeclidinium 62.5 mcg-vilanterol 1 puff inhalation DAILY COPD 03/25/21 02/20/24    
    
25 mcg/actuation powdr for       
    
inhalation       
     
tiotropium 2.5 mcg-olodaterol 2.5 2 puff inhalation DAILY 07/13/23 02/20/24    
    
mcg/actuation mist for inhalation       
    
(Stiolto Respimat)       
    
    
                                  Previous Rx's    
    
    
    
 Medication  Instructions  Recorded    
     
diltiazem HCl 120 mg 120 mg PO DAILY ##30 03/27/21    
    
capsule,extended release 24 hr      
     
furosemide 20 mg tablet 20 mg PO DAILY #30 tabs 03/27/21    
     
apixaban 2.5 mg tablet (Eliquis) See Rx Instructions .Route 05/16/22    
    
 .COMPLEX #180 tabs     
     
losartan 50 mg tablet See Rx Instructions .Route 03/17/23    
    
 .COMPLEX #90 tabs     
     
methylprednisolone 4 mg tablets in 4 mg PO AS DIRECTED 6 days #21 tabs 02/20/24    
    
a dose pack      
     
triamcinolone acetonide 0.1 % 1 applic topical BID PRN itching 02/20/24    
    
topical cream #30 grams     
    
    
    
                                    Allergies    
    
    
    
Allergy/AdvReac Type Severity Reaction Status Date / Time    
     
No Known Allergies Allergy   Verified 02/20/24 19:53    
    
    
    
    
[facility]    
Disclaimer:     
The information contained in this section may have been updated after the   
patient was seen, as this information can be updated by other users.    
    
Medical History (Reviewed 01/22/24 @ 12:53 by Josee Clay)    
    
Cardiac pacemaker in situ    
COPD (chronic obstructive pulmonary disease)    
Daytime somnolence    
Dizziness    
HTN (hypertension)    
Hyperlipidemia    
Hypertension    
    
    
Social History (Reviewed 02/20/24 @ 19:53 by Gladys Melvin RN)    
Smoking Status:  Never smoker     
alcohol intake:  never     
substance use type:  denies use     
current occupational status:  retired     
Travel in the last 8 weeks:  None     
household members:  family     
housing:  house     
    
    
    
ROS Obtained: Yes All systems reviewed & no additional complaints except as d  
ocumented    
Constitutional    
Constitutional: Denies chills and Denies fever(s)    
Eyes    
Eyes: Denies eye discharge    
ENT    
Ears, Nose, Mouth, and Throat: Denies dizziness, Denies otalgia and Denies sore   
throat    
Cardiovascular    
Cardiovascular: Denies chest pain    
Respiratory    
Respiratory: Denies shortness of breath, Denies chest congestion, Denies cough,   
Denies stridor and Denies wheezing    
Gastrointestinal    
Gastrointestingal: Denies nausea or vomiting    
Musculoskeletal    
Musculoskeletal: Reports system reviewed and no additional complaints, except as  
documented and Denies arthralgias    
Integumentary/Breasts    
Skin/Breast: Reports as per HPI and Reports rash    
Neurologic    
Neurologic: Denies dizziness and Denies paresthesias    
Allergic/Immunologic    
Allergic/Immunologic: Denies wheezing    
    
Physical Exam    
General    
General appearance: alert and in no apparent distress    
Head    
Head exam: atraumatic, normocephalic and normal inspection    
Eye    
Eye exam: Present normal appearance, PERRL and EOMI    
ENT    
ENT exam: Present normal exam, normal oropharynx, mucous membranes moist, TM's   
normal bilaterally and normal external ear exam    
Neck    
Neck exam: Present normal inspection, full ROM and trachea midline; Absent   
meningismus or lymphadenopathy    
Chest    
Chest inspection: Present normal inspection and symmetric chest wall rise;   
Absent tenderness    
Respiratory    
Respiratory exam: Present normal lung sounds bilaterally; Absent respiratory   
distress    
Cardiovascular    
Cardiovascular exam: Present regular rate and normal rhythm; Absent JVD    
Abdominal Exam    
Abdominal exam: Present soft and normal bowel sounds; Absent distention,   
tenderness or guarding    
Extremities Exam    
Extremities exam: Present normal inspection, full ROM and normal capillary   
refill; Absent calf tenderness    
Back Exam    
Back exam: Present normal inspection; Absent tenderness    
Neurological Exam    
Neurological exam: Present alert and oriented X3    
Psychiatric    
Psychiatric exam: Present normal affect and normal mood    
Skin    
Skin exam: Present rash (there is a patch of maculopapular lesions on her back   
that measures 6 cm diameter, there are multiple sparse maculopapular lesions on   
her lower back, buttocks and thighs. No vesicles noted. no induration noted, no   
open wounds or drainage noted. )    
Lymphatic    
Lymphatic Findings: no adenopathy    
    
Medical Decision Making    
Medical Records    
Medical records reviewed: No I reviewed the patient's medical records.    
[place]iver Inquiry    
Pt receiving controlled substance: No    
Medical Decision Narrative:     
Her skin lesions do not follow a zoster-like pattern. They are widespread on her  
back, buttocks, and both thighs. No vesicles noted. So, this doesn't appear to   
be shingles.

## (undated) DEVICE — COVER,LIGHT HANDLE,FLX,1/PK: Brand: MEDLINE INDUSTRIES, INC.

## (undated) DEVICE — ANTIBACTERIAL UNDYED BRAIDED (POLYGLACTIN 910), SYNTHETIC ABSORBABLE SUTURE: Brand: COATED VICRYL

## (undated) DEVICE — SIGMA LCS HIGH PERFORMANCE INSTRUMENTS STERILE THREADED PINS: Brand: SIGMA LCS HIGH PERFORMANCE

## (undated) DEVICE — RECIPROCATING BLADE, DOUBLE SIDED, OFFSET  (70.0 X 0.8 X 12.5MM)

## (undated) DEVICE — CANN NASL CO2 DIVIDED A/

## (undated) DEVICE — BNDG ELAS ELITE V/CLOSE 6IN 5YD LF STRL

## (undated) DEVICE — SOL LR 1000ML

## (undated) DEVICE — STRYKER PERFORMANCE SERIES SAGITTAL BLADE: Brand: STRYKER PERFORMANCE SERIES

## (undated) DEVICE — GLV SURG SIGNATURE TOUCH PF LTX 8 STRL BX/50

## (undated) DEVICE — SIGMA LCS HIGH PERFORMANCE STERILE THREADED HEADED PINS: Brand: SIGMA LCS HIGH PERFORMANCE

## (undated) DEVICE — SYS SKIN CLS DERMABOND PRINEO W/22CM MESH TP

## (undated) DEVICE — MEDI-VAC NON-CONDUCTIVE SUCTION TUBING: Brand: CARDINAL HEALTH

## (undated) DEVICE — ST NERV BLCK CONT CONTIPLEX ECHO CLSD 18G 4IN

## (undated) DEVICE — KT PUMP PAIN ONQ CBLOC SELCTAFLO 400ML

## (undated) DEVICE — ENCORE® LATEX MICRO SIZE 8, STERILE LATEX POWDER-FREE SURGICAL GLOVE: Brand: ENCORE

## (undated) DEVICE — ST EXT IV SMARTSITE 2VLV SP M LL 5ML IV1

## (undated) DEVICE — MEDI-VAC YANKAUER SUCTION HANDLE W/BULBOUS TIP: Brand: CARDINAL HEALTH

## (undated) DEVICE — SYR LUERLOK 50ML

## (undated) DEVICE — NDL HYPO ECLPS SFTY 18G 1 1/2IN

## (undated) DEVICE — UNDERCAST PADDING: Brand: DEROYAL

## (undated) DEVICE — DRSNG PAD ABD 8X10IN STRL

## (undated) DEVICE — DRAPE,REIN 53X77,STERILE: Brand: MEDLINE

## (undated) DEVICE — TRY EPID SFTY 18G 3.5IN 1T7680

## (undated) DEVICE — PK KN TOTL 10